# Patient Record
Sex: FEMALE | Race: WHITE | Employment: OTHER | URBAN - METROPOLITAN AREA
[De-identification: names, ages, dates, MRNs, and addresses within clinical notes are randomized per-mention and may not be internally consistent; named-entity substitution may affect disease eponyms.]

---

## 2016-07-07 LAB
CHOLESTEROL, TOTAL: 168 MG/DL
CHOLESTEROL/HDL RATIO: NORMAL
HDLC SERPL-MCNC: 44 MG/DL (ref 35–70)
LDL CHOLESTEROL CALCULATED: 69 MG/DL (ref 0–160)
TRIGL SERPL-MCNC: 275 MG/DL
VLDLC SERPL CALC-MCNC: NORMAL MG/DL

## 2016-10-18 LAB
BASOPHILS ABSOLUTE: NORMAL /ΜL
BASOPHILS RELATIVE PERCENT: NORMAL %
EOSINOPHILS ABSOLUTE: NORMAL /ΜL
EOSINOPHILS RELATIVE PERCENT: NORMAL %
HBA1C MFR BLD: 6.2 %
HCT VFR BLD CALC: 39.7 % (ref 36–46)
HEMOGLOBIN: 13.6 G/DL (ref 12–16)
LYMPHOCYTES ABSOLUTE: NORMAL /ΜL
LYMPHOCYTES RELATIVE PERCENT: NORMAL %
MCH RBC QN AUTO: NORMAL PG
MCHC RBC AUTO-ENTMCNC: NORMAL G/DL
MCV RBC AUTO: NORMAL FL
MONOCYTES ABSOLUTE: NORMAL /ΜL
MONOCYTES RELATIVE PERCENT: NORMAL %
NEUTROPHILS ABSOLUTE: NORMAL /ΜL
NEUTROPHILS RELATIVE PERCENT: NORMAL %
PLATELET # BLD: 237 K/ΜL
PMV BLD AUTO: NORMAL FL
RBC # BLD: 4.22 10^6/ΜL
WBC # BLD: 8 10^3/ML

## 2016-10-19 LAB
ALBUMIN SERPL-MCNC: 4.5 G/DL
ALP BLD-CCNC: 35 U/L
ALT SERPL-CCNC: 11 U/L
AST SERPL-CCNC: 20 U/L
BILIRUB SERPL-MCNC: 0.4 MG/DL (ref 0.1–1.4)
BUN BLDV-MCNC: 52 MG/DL
CALCIUM SERPL-MCNC: 9.8 MG/DL
CHLORIDE BLD-SCNC: 95 MMOL/L
CO2: 29 MMOL/L
CREAT SERPL-MCNC: 1.2 MG/DL
GFR CALCULATED: 42
GLUCOSE BLD-MCNC: 134 MG/DL
POTASSIUM SERPL-SCNC: 4.2 MMOL/L
SODIUM BLD-SCNC: 138 MMOL/L
TOTAL PROTEIN: 608

## 2017-04-12 RX ORDER — DIGOXIN 125 MCG
125 TABLET ORAL DAILY
Qty: 90 TABLET | Refills: 3 | Status: CANCELLED | OUTPATIENT
Start: 2017-04-12 | End: 2018-04-13

## 2017-04-13 RX ORDER — DIGOXIN 125 MCG
125 TABLET ORAL DAILY
Qty: 90 TABLET | Refills: 3 | Status: SHIPPED | OUTPATIENT
Start: 2017-04-13 | End: 2018-03-02 | Stop reason: SDUPTHER

## 2017-06-05 ENCOUNTER — OFFICE VISIT (OUTPATIENT)
Dept: CARDIOLOGY CLINIC | Age: 82
End: 2017-06-05

## 2017-06-05 VITALS
DIASTOLIC BLOOD PRESSURE: 60 MMHG | SYSTOLIC BLOOD PRESSURE: 136 MMHG | BODY MASS INDEX: 24.9 KG/M2 | HEART RATE: 84 BPM | WEIGHT: 126.8 LBS | HEIGHT: 60 IN

## 2017-06-05 DIAGNOSIS — Z98.61 HISTORY OF PTCA: ICD-10-CM

## 2017-06-05 DIAGNOSIS — Z95.1 S/P CABG X 1: ICD-10-CM

## 2017-06-05 DIAGNOSIS — Z86.79 H/O CARDIOMYOPATHY: ICD-10-CM

## 2017-06-05 DIAGNOSIS — Z98.890 H/O MITRAL VALVE REPAIR: ICD-10-CM

## 2017-06-05 DIAGNOSIS — I10 ESSENTIAL HYPERTENSION: ICD-10-CM

## 2017-06-05 DIAGNOSIS — I34.0 NON-RHEUMATIC MITRAL REGURGITATION: ICD-10-CM

## 2017-06-05 DIAGNOSIS — I36.1 NON-RHEUMATIC TRICUSPID VALVE INSUFFICIENCY: ICD-10-CM

## 2017-06-05 DIAGNOSIS — I38 VHD (VALVULAR HEART DISEASE): ICD-10-CM

## 2017-06-05 DIAGNOSIS — I27.20 PULMONARY HTN (HCC): ICD-10-CM

## 2017-06-05 DIAGNOSIS — I25.10 CORONARY ARTERY DISEASE INVOLVING NATIVE CORONARY ARTERY OF NATIVE HEART WITHOUT ANGINA PECTORIS: Primary | ICD-10-CM

## 2017-06-05 DIAGNOSIS — E78.2 MIXED HYPERLIPIDEMIA: ICD-10-CM

## 2017-06-05 PROCEDURE — 99213 OFFICE O/P EST LOW 20 MIN: CPT | Performed by: INTERNAL MEDICINE

## 2017-06-13 ENCOUNTER — TELEPHONE (OUTPATIENT)
Dept: CARDIOLOGY CLINIC | Age: 82
End: 2017-06-13

## 2017-10-16 LAB
ALBUMIN SERPL-MCNC: 4.6 G/DL
ALP BLD-CCNC: 34 U/L
ALT SERPL-CCNC: 12 U/L
ANION GAP SERPL CALCULATED.3IONS-SCNC: NORMAL MMOL/L
AST SERPL-CCNC: 15 U/L
AVERAGE GLUCOSE: NORMAL
BASOPHILS ABSOLUTE: 0 /ΜL
BASOPHILS RELATIVE PERCENT: 0.6 %
BILIRUB SERPL-MCNC: 0.5 MG/DL (ref 0.1–1.4)
BUN BLDV-MCNC: 64 MG/DL
CALCIUM SERPL-MCNC: 10.5 MG/DL
CHLORIDE BLD-SCNC: 97 MMOL/L
CHOLESTEROL, TOTAL: 203 MG/DL
CHOLESTEROL/HDL RATIO: NORMAL
CO2: 28 MMOL/L
CREAT SERPL-MCNC: 1.2 MG/DL
EOSINOPHILS ABSOLUTE: 0.2 /ΜL
EOSINOPHILS RELATIVE PERCENT: 2.7 %
GFR CALCULATED: NORMAL
GLUCOSE BLD-MCNC: 124 MG/DL
HBA1C MFR BLD: 5.5 %
HCT VFR BLD CALC: 42.1 % (ref 36–46)
HDLC SERPL-MCNC: 41 MG/DL (ref 35–70)
HEMOGLOBIN: 14.2 G/DL (ref 12–16)
LDL CHOLESTEROL CALCULATED: NORMAL MG/DL (ref 0–160)
LYMPHOCYTES ABSOLUTE: 1.3 /ΜL
LYMPHOCYTES RELATIVE PERCENT: 20.8 %
MCH RBC QN AUTO: 32.6 PG
MCHC RBC AUTO-ENTMCNC: NORMAL G/DL
MCV RBC AUTO: 96.8 FL
MONOCYTES ABSOLUTE: 0.5 /ΜL
MONOCYTES RELATIVE PERCENT: 7.3 %
NEUTROPHILS ABSOLUTE: 4.4 /ΜL
NEUTROPHILS RELATIVE PERCENT: 68.6 %
PLATELET # BLD: 218 K/ΜL
PMV BLD AUTO: NORMAL FL
POTASSIUM SERPL-SCNC: 4.5 MMOL/L
RBC # BLD: 4.35 10^6/ΜL
SODIUM BLD-SCNC: 142 MMOL/L
TOTAL PROTEIN: 7.2
TRIGL SERPL-MCNC: 409 MG/DL
VLDLC SERPL CALC-MCNC: NORMAL MG/DL
WBC # BLD: 6.4 10^3/ML

## 2017-12-22 RX ORDER — FENOFIBRATE 145 MG/1
TABLET, COATED ORAL
Qty: 90 TABLET | Refills: 3 | Status: SHIPPED | OUTPATIENT
Start: 2017-12-22 | End: 2018-11-12

## 2018-03-04 RX ORDER — DIGOXIN 125 MCG
TABLET ORAL
Qty: 90 TABLET | Refills: 3 | Status: SHIPPED | OUTPATIENT
Start: 2018-03-04 | End: 2019-03-05 | Stop reason: SDUPTHER

## 2018-06-11 ENCOUNTER — OFFICE VISIT (OUTPATIENT)
Dept: CARDIOLOGY CLINIC | Age: 83
End: 2018-06-11

## 2018-06-11 VITALS
BODY MASS INDEX: 24.46 KG/M2 | WEIGHT: 124.6 LBS | SYSTOLIC BLOOD PRESSURE: 122 MMHG | DIASTOLIC BLOOD PRESSURE: 60 MMHG | HEIGHT: 60 IN | HEART RATE: 68 BPM

## 2018-06-11 DIAGNOSIS — R60.0 LEG EDEMA: ICD-10-CM

## 2018-06-11 DIAGNOSIS — I25.10 CORONARY ARTERY DISEASE INVOLVING NATIVE CORONARY ARTERY OF NATIVE HEART WITHOUT ANGINA PECTORIS: Primary | ICD-10-CM

## 2018-06-11 DIAGNOSIS — Z98.890 H/O MITRAL VALVE REPAIR: ICD-10-CM

## 2018-06-11 DIAGNOSIS — I10 ESSENTIAL HYPERTENSION: ICD-10-CM

## 2018-06-11 DIAGNOSIS — I38 VHD (VALVULAR HEART DISEASE): ICD-10-CM

## 2018-06-11 DIAGNOSIS — I27.20 PULMONARY HTN (HCC): ICD-10-CM

## 2018-06-11 DIAGNOSIS — Z87.19 H/O HIATAL HERNIA: ICD-10-CM

## 2018-06-11 DIAGNOSIS — Z98.61 HISTORY OF PTCA: ICD-10-CM

## 2018-06-11 DIAGNOSIS — E78.2 MIXED HYPERLIPIDEMIA: ICD-10-CM

## 2018-06-11 DIAGNOSIS — I36.1 NON-RHEUMATIC TRICUSPID VALVE INSUFFICIENCY: ICD-10-CM

## 2018-06-11 DIAGNOSIS — Z86.79 H/O CARDIOMYOPATHY: ICD-10-CM

## 2018-06-11 DIAGNOSIS — Z95.1 S/P CABG X 1: ICD-10-CM

## 2018-06-11 DIAGNOSIS — I34.0 NON-RHEUMATIC MITRAL REGURGITATION: ICD-10-CM

## 2018-06-11 PROCEDURE — 1036F TOBACCO NON-USER: CPT | Performed by: INTERNAL MEDICINE

## 2018-06-11 PROCEDURE — 1123F ACP DISCUSS/DSCN MKR DOCD: CPT | Performed by: INTERNAL MEDICINE

## 2018-06-11 PROCEDURE — G8400 PT W/DXA NO RESULTS DOC: HCPCS | Performed by: INTERNAL MEDICINE

## 2018-06-11 PROCEDURE — G8427 DOCREV CUR MEDS BY ELIG CLIN: HCPCS | Performed by: INTERNAL MEDICINE

## 2018-06-11 PROCEDURE — 1090F PRES/ABSN URINE INCON ASSESS: CPT | Performed by: INTERNAL MEDICINE

## 2018-06-11 PROCEDURE — 4040F PNEUMOC VAC/ADMIN/RCVD: CPT | Performed by: INTERNAL MEDICINE

## 2018-06-11 PROCEDURE — 99214 OFFICE O/P EST MOD 30 MIN: CPT | Performed by: INTERNAL MEDICINE

## 2018-06-11 PROCEDURE — G8598 ASA/ANTIPLAT THER USED: HCPCS | Performed by: INTERNAL MEDICINE

## 2018-06-11 PROCEDURE — G8420 CALC BMI NORM PARAMETERS: HCPCS | Performed by: INTERNAL MEDICINE

## 2018-06-11 RX ORDER — NEBIVOLOL 20 MG/1
20 TABLET ORAL 2 TIMES DAILY
Qty: 180 TABLET | Refills: 3 | Status: SHIPPED | OUTPATIENT
Start: 2018-06-11 | End: 2020-04-15 | Stop reason: SDUPTHER

## 2018-06-11 RX ORDER — ICOSAPENT ETHYL 1000 MG/1
2 CAPSULE ORAL 2 TIMES DAILY
Qty: 60 CAPSULE | Refills: 5 | Status: SHIPPED | OUTPATIENT
Start: 2018-06-11 | End: 2018-06-18 | Stop reason: SDUPTHER

## 2018-06-14 ENCOUNTER — HOSPITAL ENCOUNTER (OUTPATIENT)
Dept: GENERAL RADIOLOGY | Age: 83
Discharge: HOME | End: 2018-06-14
Attending: INTERNAL MEDICINE | Admitting: INTERNAL MEDICINE

## 2018-06-14 LAB
ALBUMIN SERPL-MCNC: 4.5 GM/DL (ref 3.4–5)
ALP BLD-CCNC: 35 IU/L (ref 40–129)
ALT SERPL-CCNC: 13 U/L (ref 10–40)
AST SERPL-CCNC: 20 IU/L (ref 15–37)
BILIRUB SERPL-MCNC: 0.4 MG/DL (ref 0–1)
BILIRUBIN DIRECT: 0.2 MG/DL (ref 0–0.3)
BILIRUBIN, INDIRECT: 0.2 MG/DL (ref 0–0.7)
CHOLESTEROL: 160 MG/DL
HDLC SERPL-MCNC: 42 MG/DL
LDL CHOLESTEROL DIRECT: 90 MG/DL
TOTAL PROTEIN: 7 GM/DL (ref 6.4–8.2)
TRIGL SERPL-MCNC: 281 MG/DL

## 2018-06-18 RX ORDER — ICOSAPENT ETHYL 1000 MG/1
2 CAPSULE ORAL 2 TIMES DAILY
Qty: 120 CAPSULE | Refills: 11 | Status: SHIPPED | OUTPATIENT
Start: 2018-06-18 | End: 2019-07-04 | Stop reason: SDUPTHER

## 2018-06-25 ENCOUNTER — PROCEDURE VISIT (OUTPATIENT)
Dept: CARDIOLOGY CLINIC | Age: 83
End: 2018-06-25

## 2018-06-25 DIAGNOSIS — Z98.61 HISTORY OF PTCA: ICD-10-CM

## 2018-06-25 DIAGNOSIS — E78.2 MIXED HYPERLIPIDEMIA: ICD-10-CM

## 2018-06-25 DIAGNOSIS — Z86.79 H/O CARDIOMYOPATHY: ICD-10-CM

## 2018-06-25 DIAGNOSIS — Z98.890 H/O MITRAL VALVE REPAIR: ICD-10-CM

## 2018-06-25 DIAGNOSIS — I34.0 NON-RHEUMATIC MITRAL REGURGITATION: ICD-10-CM

## 2018-06-25 DIAGNOSIS — I27.20 PULMONARY HTN (HCC): ICD-10-CM

## 2018-06-25 DIAGNOSIS — I25.10 CORONARY ARTERY DISEASE INVOLVING NATIVE CORONARY ARTERY OF NATIVE HEART WITHOUT ANGINA PECTORIS: ICD-10-CM

## 2018-06-25 DIAGNOSIS — Z87.19 H/O HIATAL HERNIA: ICD-10-CM

## 2018-06-25 DIAGNOSIS — Z95.1 S/P CABG X 1: ICD-10-CM

## 2018-06-25 DIAGNOSIS — R60.0 LEG EDEMA: ICD-10-CM

## 2018-06-25 DIAGNOSIS — I36.1 NON-RHEUMATIC TRICUSPID VALVE INSUFFICIENCY: ICD-10-CM

## 2018-06-25 DIAGNOSIS — I10 ESSENTIAL HYPERTENSION: ICD-10-CM

## 2018-06-25 DIAGNOSIS — I38 VHD (VALVULAR HEART DISEASE): Primary | ICD-10-CM

## 2018-06-25 LAB
LV EF: 53 %
LVEF MODALITY: NORMAL

## 2018-06-25 PROCEDURE — 93306 TTE W/DOPPLER COMPLETE: CPT | Performed by: INTERNAL MEDICINE

## 2018-06-27 ENCOUNTER — TELEPHONE (OUTPATIENT)
Dept: CARDIOLOGY CLINIC | Age: 83
End: 2018-06-27

## 2018-06-29 RX ORDER — OMEGA-3-ACID ETHYL ESTERS 1 G/1
2 CAPSULE, LIQUID FILLED ORAL 2 TIMES DAILY
Qty: 180 CAPSULE | Refills: 3 | Status: SHIPPED | OUTPATIENT
Start: 2018-06-29 | End: 2018-11-12

## 2018-11-12 ENCOUNTER — OFFICE VISIT (OUTPATIENT)
Dept: CARDIOLOGY CLINIC | Age: 83
End: 2018-11-12
Payer: MEDICARE

## 2018-11-12 VITALS
BODY MASS INDEX: 24.03 KG/M2 | HEIGHT: 60 IN | SYSTOLIC BLOOD PRESSURE: 146 MMHG | HEART RATE: 80 BPM | WEIGHT: 122.4 LBS | DIASTOLIC BLOOD PRESSURE: 70 MMHG

## 2018-11-12 DIAGNOSIS — Z95.1 S/P CABG X 1: ICD-10-CM

## 2018-11-12 DIAGNOSIS — Z86.79 H/O CARDIOMYOPATHY: ICD-10-CM

## 2018-11-12 DIAGNOSIS — Z98.890 H/O MITRAL VALVE REPAIR: ICD-10-CM

## 2018-11-12 DIAGNOSIS — I34.0 NON-RHEUMATIC MITRAL REGURGITATION: ICD-10-CM

## 2018-11-12 DIAGNOSIS — I27.20 PULMONARY HTN (HCC): ICD-10-CM

## 2018-11-12 DIAGNOSIS — I10 ESSENTIAL HYPERTENSION: ICD-10-CM

## 2018-11-12 DIAGNOSIS — I38 VHD (VALVULAR HEART DISEASE): ICD-10-CM

## 2018-11-12 DIAGNOSIS — I36.1 NON-RHEUMATIC TRICUSPID VALVE INSUFFICIENCY: ICD-10-CM

## 2018-11-12 DIAGNOSIS — E78.2 MIXED HYPERLIPIDEMIA: ICD-10-CM

## 2018-11-12 DIAGNOSIS — I25.10 CORONARY ARTERY DISEASE INVOLVING NATIVE CORONARY ARTERY OF NATIVE HEART WITHOUT ANGINA PECTORIS: Primary | ICD-10-CM

## 2018-11-12 DIAGNOSIS — Z98.61 HISTORY OF PTCA: ICD-10-CM

## 2018-11-12 PROCEDURE — 99214 OFFICE O/P EST MOD 30 MIN: CPT | Performed by: INTERNAL MEDICINE

## 2018-11-12 PROCEDURE — 1101F PT FALLS ASSESS-DOCD LE1/YR: CPT | Performed by: INTERNAL MEDICINE

## 2018-11-12 PROCEDURE — G8598 ASA/ANTIPLAT THER USED: HCPCS | Performed by: INTERNAL MEDICINE

## 2018-11-12 PROCEDURE — 1123F ACP DISCUSS/DSCN MKR DOCD: CPT | Performed by: INTERNAL MEDICINE

## 2018-11-12 PROCEDURE — G8420 CALC BMI NORM PARAMETERS: HCPCS | Performed by: INTERNAL MEDICINE

## 2018-11-12 PROCEDURE — 1090F PRES/ABSN URINE INCON ASSESS: CPT | Performed by: INTERNAL MEDICINE

## 2018-11-12 PROCEDURE — 1036F TOBACCO NON-USER: CPT | Performed by: INTERNAL MEDICINE

## 2018-11-12 PROCEDURE — G8484 FLU IMMUNIZE NO ADMIN: HCPCS | Performed by: INTERNAL MEDICINE

## 2018-11-12 PROCEDURE — 93000 ELECTROCARDIOGRAM COMPLETE: CPT | Performed by: INTERNAL MEDICINE

## 2018-11-12 PROCEDURE — G8427 DOCREV CUR MEDS BY ELIG CLIN: HCPCS | Performed by: INTERNAL MEDICINE

## 2018-11-12 PROCEDURE — G8400 PT W/DXA NO RESULTS DOC: HCPCS | Performed by: INTERNAL MEDICINE

## 2018-11-12 PROCEDURE — 4040F PNEUMOC VAC/ADMIN/RCVD: CPT | Performed by: INTERNAL MEDICINE

## 2018-11-12 NOTE — PROGRESS NOTES
7/2010    H/O transesophageal echocardiography (BRITTANI) for monitoring 06-    3-4+ MITRIL REGURG. AND MILD TRICUSPID REGURG.  History of IBS     History of PTCA 11-    STENT (3.0 X 12 PROMUS) RCA. MILD OSTIAL DISEASE LM. CRITICAL OSTIAL DISEASE RCA. ELEVATED SYSTEMIC PRESSURES AT REST. MILD MITRIL [address]  Hx of cardiovascular stress test 8/17/2015    cardiolite-normal    Hx of Doppler echocardiogram 5/6/2013    EF55%  mild mitral regurg, moderate tricuspid regurg, right ventricular systolic pressure elevated at 60mmHg    Hx of Doppler echocardiogram 06/25/2018    LV function and size are normal, Ejection Fraction 50-55 %. Normal left ventricular wall thickness. No regional wall motion abnormalities were detected. Normal diastolic filling pattern for age. 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 CABG X 1 W/SVG TO RCA.      FOOT SURGERY  10-    LEFT    HYSTERECTOMY  1990    JOINT REPLACEMENT  1989 AND 2001    BILATERAL HIPS      As reviewed   Family History   Problem Relation Age of Onset    High Blood Pressure Mother     Heart Disease Father     High Blood Pressure Father     Cancer Brother         COLON     Social History   Substance Use Topics    Smoking status: Never Smoker    Smokeless tobacco: Never Used      Comment: Reviewed 11/1/16    Alcohol use No      Review of Systems:    Constitutional: Negative for diaphoresis and fatigue  Psychological:Negative for anxiety or depression  HENT: Negative for headaches, nasal congestion, sinus pain or vertigo  Eyes: Date    WBC 6.4 10/16/2017    HCT 42.1 10/16/2017    MCV 96.8 10/16/2017     10/16/2017     Lab Results   Component Value Date    CHOL 160 06/14/2018    TRIG 281 (H) 06/14/2018    HDL 42 06/14/2018    LDLCALC 69 07/06/2016    LDLDIRECT 90 06/14/2018     Lab Results   Component Value Date    ALT 13 06/14/2018    AST 20 06/14/2018     BMP:    Lab Results   Component Value Date     10/16/2017    K 4.5 10/16/2017    CL 97 10/16/2017    CO2 28 10/16/2017    BUN 64 10/16/2017    CREATININE 1.2 10/16/2017     CMP:   Lab Results   Component Value Date     10/16/2017    K 4.5 10/16/2017    CL 97 10/16/2017    CO2 28 10/16/2017    BUN 64 10/16/2017    PROT 7.0 06/14/2018     TSH:    Lab Results   Component Value Date    TSH 2.8 09/02/2014       QUALITY MEASURES REVIEWED:  1.CAD:Patient is taking anti platelet agent:Yes  2. DYSLIPIDEMIA: Patient is on cholesterol lowering medication:Yes  3. Beta-Blocker therapy for CAD, if prior Myocardial Infarction:Yes  4. Atrial fibrillation & anticoagulation therapy No    Impression:    1. Coronary artery disease involving native coronary artery of native heart without angina pectoris    2. H/O cardiomyopathy    3. H/O mitral valve repair    4. History of PTCA    5. Mixed hyperlipidemia    6. Essential hypertension    7. Non-rheumatic mitral regurgitation    8. Pulmonary HTN ([facility] [address]    9. S/P CABG x 1    10. Non-rheumatic tricuspid valve insufficiency    11.  VHD (valvular heart disease)       Patient Active Problem List   Diagnosis Code    Hypertension I10    CAD (coronary artery disease) I25.10    S/P CABG x 1 Z95.1    VHD (valvular heart disease) I38    Mitral regurgitation I34.0    Tricuspid regurgitation I07.1    H/O mitral valve repair Z98.890    Cancer ([facility] [address] C80.1    History of IBS Z87.19    H/O hiatal hernia Z87.19    Hyperlipidemia E78.5    History of PTCA Z80.64    H/O cardiomyopathy Z86.79    DJD (degenerative joint disease) M19.90    Pulmonary HTN

## 2018-12-17 RX ORDER — OMEGA-3-ACID ETHYL ESTERS 1 G/1
CAPSULE, LIQUID FILLED ORAL
Qty: 180 CAPSULE | Refills: 3 | Status: SHIPPED | OUTPATIENT
Start: 2018-12-17 | End: 2019-06-17

## 2019-03-06 RX ORDER — DIGOXIN 125 MCG
125 TABLET ORAL DAILY
Qty: 90 TABLET | Refills: 3 | Status: SHIPPED | OUTPATIENT
Start: 2019-03-06 | End: 2020-04-15 | Stop reason: SDUPTHER

## 2019-05-17 ENCOUNTER — TELEPHONE (OUTPATIENT)
Dept: CARDIOLOGY CLINIC | Age: 84
End: 2019-05-17

## 2019-05-17 NOTE — TELEPHONE ENCOUNTER
Called patient to reschedule, she had serious fall and unable to come in at this time.   I have refil request from [facility] for bystolic, states she has plenty and do not reorder now

## 2019-05-29 RX ORDER — NEBIVOLOL 20 MG/1
20 TABLET ORAL 2 TIMES DAILY
Qty: 180 TABLET | Refills: 3 | OUTPATIENT
Start: 2019-05-29

## 2019-06-17 RX ORDER — OMEGA-3-ACID ETHYL ESTERS 1 G/1
1 CAPSULE, LIQUID FILLED ORAL 2 TIMES DAILY
Qty: 180 CAPSULE | Refills: 3 | Status: SHIPPED | OUTPATIENT
Start: 2019-06-17 | End: 2020-06-01

## 2019-07-08 RX ORDER — ICOSAPENT ETHYL 1000 MG/1
2 CAPSULE ORAL 2 TIMES DAILY
Qty: 120 CAPSULE | Refills: 11 | Status: SHIPPED | OUTPATIENT
Start: 2019-07-08 | End: 2020-06-25

## 2020-04-17 RX ORDER — DIGOXIN 125 MCG
125 TABLET ORAL DAILY
Qty: 90 TABLET | Refills: 3 | Status: SHIPPED | OUTPATIENT
Start: 2020-04-17 | End: 2020-07-14 | Stop reason: SDUPTHER

## 2020-04-17 RX ORDER — NEBIVOLOL 20 MG/1
20 TABLET ORAL 2 TIMES DAILY
Qty: 180 TABLET | Refills: 3 | Status: SHIPPED | OUTPATIENT
Start: 2020-04-17 | End: 2020-07-14 | Stop reason: SDUPTHER

## 2020-06-02 RX ORDER — OMEGA-3-ACID ETHYL ESTERS 1 G/1
1 CAPSULE, LIQUID FILLED ORAL 2 TIMES DAILY
Qty: 180 CAPSULE | Refills: 3 | Status: SHIPPED | OUTPATIENT
Start: 2020-06-02 | End: 2021-04-29

## 2020-06-25 RX ORDER — ICOSAPENT ETHYL 1000 MG/1
2 CAPSULE ORAL 2 TIMES DAILY
Qty: 120 CAPSULE | Refills: 3 | Status: SHIPPED | OUTPATIENT
Start: 2020-06-25 | End: 2020-11-09

## 2020-07-14 ENCOUNTER — VIRTUAL VISIT (OUTPATIENT)
Dept: CARDIOLOGY CLINIC | Age: 85
End: 2020-07-14
Payer: MEDICARE

## 2020-07-14 PROCEDURE — 4040F PNEUMOC VAC/ADMIN/RCVD: CPT | Performed by: INTERNAL MEDICINE

## 2020-07-14 PROCEDURE — 1123F ACP DISCUSS/DSCN MKR DOCD: CPT | Performed by: INTERNAL MEDICINE

## 2020-07-14 PROCEDURE — G8427 DOCREV CUR MEDS BY ELIG CLIN: HCPCS | Performed by: INTERNAL MEDICINE

## 2020-07-14 PROCEDURE — 1090F PRES/ABSN URINE INCON ASSESS: CPT | Performed by: INTERNAL MEDICINE

## 2020-07-14 PROCEDURE — 99213 OFFICE O/P EST LOW 20 MIN: CPT | Performed by: INTERNAL MEDICINE

## 2020-07-14 PROCEDURE — G8400 PT W/DXA NO RESULTS DOC: HCPCS | Performed by: INTERNAL MEDICINE

## 2020-07-14 PROCEDURE — G8421 BMI NOT CALCULATED: HCPCS | Performed by: INTERNAL MEDICINE

## 2020-07-14 PROCEDURE — 1036F TOBACCO NON-USER: CPT | Performed by: INTERNAL MEDICINE

## 2020-07-14 RX ORDER — NEBIVOLOL 20 MG/1
20 TABLET ORAL 2 TIMES DAILY
Qty: 180 TABLET | Refills: 3 | Status: SHIPPED | OUTPATIENT
Start: 2020-07-14 | End: 2021-07-06 | Stop reason: SDUPTHER

## 2020-07-14 RX ORDER — DIGOXIN 125 MCG
125 TABLET ORAL DAILY
Qty: 90 TABLET | Refills: 3 | Status: SHIPPED | OUTPATIENT
Start: 2020-07-14 | End: 2021-07-06 | Stop reason: SDUPTHER

## 2020-07-14 NOTE — LETTER
Lisa Reagan Sheets  1935  [id number]    Have you had any Chest Pain - No    Have you had any Shortness of Breath - No     Have you had any dizziness - No    Have you had any palpitations - No    Do you have any edema - swelling in legs    If Yes - CHECK TO SEE IF THE EDEMA IS PITTING  How long have they been having edema - in the last week  If Yes - Have they worn compression stockings yes  Notices it more when it is hot outside.     Check Venous \"LEG PROBLEM Questionnaire\"    Ask patient [facility] if [facility] are not already [facility]    Check to see if we have an E-MAIL on file for the patient    Check medication list thoroughly!!!  BE SURE TO ASK PATIENT IF THEY NEED MEDICATION REFILLS

## 2020-07-14 NOTE — LETTER
[address]  [address] [address]  Phone: [phone]  Fax: [phone]    Rachna Bojorquez MD        July 14, 2020     Deshaun Amaro MD  [place] [address]    Patient: Markus Dia  MR Number: [id number]  YOB: 1935  Date of Visit: 7/14/2020    Dear Dr. Deshaun Amaro:    Thank you for the request for consultation for Imani Lozoya to me for the evaluation of CAD / VHD. Below are the relevant portions of my assessment and plan of care. If you have questions, please do not hesitate to call me. I look forward to following Raquel Isaac along with you.     Sincerely,        Rachna Bojorquez MD

## 2020-07-14 NOTE — PROGRESS NOTES
OFFICE PROGRESS NOTES      Lieutenant Collins is a 80 y.o. female who has    CHIEF COMPLAINT AS FOLLOWS:  CHEST PAIN: Patient denies any C/O chest pains at this time. SOB: No C/O SOB at this time. LEG EDEMA: + leg edema   PALPITATIONS: Denies any C/O Palpitations                                 DIZZINESS: No C/O Dizziness                      SYNCOPE: None   OTHER:                                     HPI: Patient is here for F/U on her CAD, HTN & Dyslipidemia problems. She does not have any new complaints at this time.     Kristina Parker has the following history recorded in care path:  Patient Active Problem List    Diagnosis Date Noted    SOB (shortness of breath)     Leg edema     Hypertension     CAD (coronary artery disease)     S/P CABG x 1     VHD (valvular heart disease)     Mitral regurgitation     Tricuspid regurgitation     H/O mitral valve repair     Cancer ([facility] [address]     History of IBS     H/O hiatal hernia     Hyperlipidemia     H/O cardiomyopathy     DJD (degenerative joint disease)     Pulmonary HTN ([facility] [address]     Hx of glaucoma     History of PTCA 11/23/2009     Current Outpatient Medications   Medication Sig Dispense Refill    sertraline (ZOLOFT) 50 MG tablet TAKE 1 TABLET BY MOUTH EVERY DAY      nebivolol (BYSTOLIC) 20 MG TABS tablet Take 1 tablet by mouth 2 times daily 180 tablet 3    digoxin (LANOXIN) 125 MCG tablet Take 1 tablet by mouth daily 90 tablet 3    Icosapent Ethyl (VASCEPA) 1 g CAPS capsule Take 2 capsules by mouth 2 times daily 120 capsule 3    omega-3 acid ethyl esters (LOVAZA) 1 g capsule Take 1 capsule by mouth 2 times daily 180 capsule 3    cyclobenzaprine (FLEXERIL) 10 MG tablet Take 10 mg by mouth 2 times daily      gabapentin (NEURONTIN) 100 MG capsule Take 100 mg by mouth 3 times daily      amLODIPine (NORVASC) 2.5 MG tablet Take 1 tablet by mouth daily 30 tablet 3    History of IBS     History of PTCA 11-    STENT (3.0 X 12 PROMUS) RCA. MILD OSTIAL DISEASE LM. CRITICAL OSTIAL DISEASE RCA. ELEVATED SYSTEMIC PRESSURES AT REST. MILD MITRIL [address]  Hx of cardiovascular stress test 8/17/2015    cardiolite-normal    Hx of Doppler echocardiogram 5/6/2013    EF55%  mild mitral regurg, moderate tricuspid regurg, right ventricular systolic pressure elevated at 60mmHg    Hx of Doppler echocardiogram 06/25/2018    LV function and size are normal, Ejection Fraction 50-55 %. Normal left ventricular wall thickness. No regional wall motion abnormalities were detected. Normal diastolic filling pattern for age. 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 CABG X 1 W/SVG TO RCA.  FOOT SURGERY  10-    LEFT    HYSTERECTOMY  1990    JOINT REPLACEMENT  1989 AND 2001    BILATERAL HIPS      As reviewed   Family History   Problem Relation Age of Onset    High Blood Pressure Mother     Heart Disease Father     High Blood Pressure Father     Cancer Brother         COLON     Social History     Tobacco Use    Smoking status: Never Smoker    Smokeless tobacco: Never Used    Tobacco comment: Reviewed 11/1/16   Substance Use Topics    Alcohol use: No     Alcohol/week: 0.0 standard drinks      Review of Systems:    Constitutional: Negative for diaphoresis and fatigue  Psychological:Negative for anxiety or depression  HENT: Negative for headaches, nasal congestion, sinus pain or vertigo  Eyes: Negative for visual disturbance.    Endocrine: Negative for polydipsia/polyuria  Respiratory: Negative for shortness of breath  Cardiovascular: Negative for chest pain, dyspnea on exertion, claudication, edema, irregular heartbeat, murmur, palpitations or shortness of breath  Gastrointestinal: Negative for abdominal pain or heartburn  Genito-Urinary: Negative for urinary frequency/urgency  Musculoskeletal: Negative for muscle pain, muscular weakness, negative for pain in arm and leg or swelling in foot and leg  Neurological: Negative for dizziness, headaches, memory loss, numbness/tingling, visual changes, syncope  Dermatological: Negative for rash    Objective: Wt Readings from Last 3 Encounters:   11/12/18 122 lb 6.4 oz (55.5 kg)   06/11/18 124 lb 9.6 oz (56.5 kg)   06/05/17 126 lb 12.8 oz (57.5 kg)     There is no height or weight on file to calculate BMI. Patient-Reported Vitals 7/14/2020   Patient-Reported Weight 129 lbs   Patient-Reported Height 5'   Patient-Reported Systolic 761   Patient-Reported Diastolic 53   Patient-Reported Pulse 71         GENERAL - Alert, oriented, pleasant, in no apparent distress.     Lab Review   No results found for: CKTOTAL, CKMB, CKMBINDEX, TROPONINT  BNP:    Lab Results   Component Value Date    BNP 72 08/10/2015    BNP 72 08/10/2015     PT/INR:  No results found for: INR  Lab Results   Component Value Date    LABA1C 5.5 10/16/2017    LABA1C 6.2 10/18/2016     Lab Results   Component Value Date    WBC 6.4 10/16/2017    WBC 8.0 10/18/2016    HCT 42.1 10/16/2017    HCT 39.7 10/18/2016    MCV 96.8 10/16/2017    MCV 95.8 09/02/2014     10/16/2017     10/18/2016     Lab Results   Component Value Date    CHOL 160 06/14/2018    CHOL 203 10/16/2017    TRIG 281 (H) 06/14/2018    TRIG 409 10/16/2017    HDL 42 06/14/2018    HDL 41 10/16/2017    LDLCALC 69 07/06/2016    LDLCALC 55 09/02/2014    LDLDIRECT 90 06/14/2018    LDLDIRECT 55 11/02/2010     Lab Results   Component Value Date    ALT 13 06/14/2018    ALT 12 10/16/2017    AST 20 06/14/2018    AST 15 10/16/2017     BMP:    Lab Results   Component Value Date     10/16/2017     10/18/2016    K 4.5 10/16/2017    K 4.2 10/18/2016    CL 97 10/16/2017    CL 95 10/18/2016    CO2 28 10/16/2017    CO2 29 10/18/2016    BUN 64 10/16/2017    BUN 52 10/18/2016    CREATININE 1.2 10/16/2017    CREATININE 1.2 10/18/2016     CMP:   Lab Results   Component Value Date     10/16/2017     10/18/2016    K 4.5 10/16/2017    K 4.2 10/18/2016    CL 97 10/16/2017    CL 95 10/18/2016    CO2 28 10/16/2017    CO2 29 10/18/2016    BUN 64 10/16/2017    BUN 52 10/18/2016    PROT 7.0 06/14/2018     TSH:    Lab Results   Component Value Date    TSH 2.8 09/02/2014       QUALITY MEASURES REVIEWED:  1.CAD:Patient is taking anti platelet agent:Yes  2. DYSLIPIDEMIA: Patient is on cholesterol lowering medication:Yes  3. Beta-Blocker therapy for CAD, if prior Myocardial Infarction:Yes  4. Atrial fibrillation & anticoagulation therapy No    Impression:    1. Coronary artery disease involving native coronary artery of native heart without angina pectoris    2. Essential hypertension    3. S/P CABG x 1    4. VHD (valvular heart disease)    5. Nonrheumatic mitral valve regurgitation    6. Nonrheumatic tricuspid valve regurgitation    7. H/O mitral valve repair    8. Mixed hyperlipidemia    9. History of PTCA    10. H/O cardiomyopathy    11. Pulmonary HTN ([facility] [address]    12. Hx of glaucoma    13. SOB (shortness of breath)    14.  Leg edema       Patient Active Problem List   Diagnosis Code    Hypertension I10    CAD (coronary artery disease) I25.10    S/P CABG x 1 Z95.1    VHD (valvular heart disease) I38    Mitral regurgitation I34.0    Tricuspid regurgitation I07.1    H/O mitral valve repair Z98.890    Cancer ([facility] [address] C80.1    History of IBS Z87.19    H/O hiatal hernia Z87.19    Hyperlipidemia E78.5    History of PTCA Z80.64    H/O cardiomyopathy Z86.79    DJD (degenerative joint disease) M19.90    none this visit                                      All previously ordered tests reviewed. ARRHYTHMIAS: H/OTachycardia   MEDICATIONS: CPM   Office f/u in six months. Primary/secondary prevention is the goal by aggressive risk modification, healthy and therapeutic life style changes for cardiovascular risk reduction. Various goals are discussed and multiple questions answered.

## 2020-07-14 NOTE — PATIENT INSTRUCTIONS
CAD:Yes   clinically stable. Patient is on optimal medical regimen ( see medication list above )  -     CORONARY ARTERY DISEASE: Patient is currently  asymptomatic from CAD. - changes in  treatment:   no           - Testing ordered:  no  [facility] classification: 1  [place] RISK SCORE:  [place] RISK SCORE:  HTN:well controlled on current medical regimen, see list above. - changes in  treatment:   no   CARDIOMYOPATHY:  known   CONGESTIVE HEART FAILURE: compensated     VHD: S/P Mitral valve repair  DYSLIPIDEMIA: Patient's profile is at / near Goal.yes,                                 HDL is low                                Tolerating current medical regimen wellyes,                                  See most recent Lab values in Labs section above. OTHER RELEVANT DIAGNOSIS:as noted in patient's active problem list:  TESTS ORDERED:   none this visit                                      All previously ordered tests reviewed. ARRHYTHMIAS: H/OTachycardia   MEDICATIONS: CPM   Office f/u in six months. Primary/secondary prevention is the goal by aggressive risk modification, healthy and therapeutic life style changes for cardiovascular risk reduction. Various goals are discussed and multiple questions answered.

## 2020-11-10 RX ORDER — ICOSAPENT ETHYL 1000 MG/1
2 CAPSULE ORAL 2 TIMES DAILY
Qty: 120 CAPSULE | Refills: 3 | Status: SHIPPED | OUTPATIENT
Start: 2020-11-10

## 2020-11-19 ENCOUNTER — TELEPHONE (OUTPATIENT)
Dept: CARDIOLOGY CLINIC | Age: 85
End: 2020-11-19

## 2021-03-29 ENCOUNTER — OFFICE VISIT (OUTPATIENT)
Dept: CARDIOLOGY CLINIC | Age: 86
End: 2021-03-29
Payer: MEDICARE

## 2021-03-29 VITALS
HEIGHT: 60 IN | HEART RATE: 68 BPM | SYSTOLIC BLOOD PRESSURE: 138 MMHG | BODY MASS INDEX: 23.95 KG/M2 | WEIGHT: 122 LBS | DIASTOLIC BLOOD PRESSURE: 70 MMHG

## 2021-03-29 DIAGNOSIS — I10 ESSENTIAL HYPERTENSION: ICD-10-CM

## 2021-03-29 DIAGNOSIS — I36.1 NONRHEUMATIC TRICUSPID VALVE REGURGITATION: ICD-10-CM

## 2021-03-29 DIAGNOSIS — I27.20 PULMONARY HTN (HCC): ICD-10-CM

## 2021-03-29 DIAGNOSIS — Z86.79 H/O CARDIOMYOPATHY: ICD-10-CM

## 2021-03-29 DIAGNOSIS — I25.10 CORONARY ARTERY DISEASE INVOLVING NATIVE CORONARY ARTERY OF NATIVE HEART WITHOUT ANGINA PECTORIS: Primary | ICD-10-CM

## 2021-03-29 DIAGNOSIS — I38 VHD (VALVULAR HEART DISEASE): ICD-10-CM

## 2021-03-29 DIAGNOSIS — E78.2 MIXED HYPERLIPIDEMIA: ICD-10-CM

## 2021-03-29 DIAGNOSIS — R60.0 LEG EDEMA: ICD-10-CM

## 2021-03-29 DIAGNOSIS — I34.0 NONRHEUMATIC MITRAL VALVE REGURGITATION: ICD-10-CM

## 2021-03-29 DIAGNOSIS — Z98.61 HISTORY OF PTCA: ICD-10-CM

## 2021-03-29 DIAGNOSIS — Z95.1 S/P CABG X 1: ICD-10-CM

## 2021-03-29 PROCEDURE — 93000 ELECTROCARDIOGRAM COMPLETE: CPT | Performed by: INTERNAL MEDICINE

## 2021-03-29 PROCEDURE — 1036F TOBACCO NON-USER: CPT | Performed by: INTERNAL MEDICINE

## 2021-03-29 PROCEDURE — 1090F PRES/ABSN URINE INCON ASSESS: CPT | Performed by: INTERNAL MEDICINE

## 2021-03-29 PROCEDURE — G8484 FLU IMMUNIZE NO ADMIN: HCPCS | Performed by: INTERNAL MEDICINE

## 2021-03-29 PROCEDURE — G8427 DOCREV CUR MEDS BY ELIG CLIN: HCPCS | Performed by: INTERNAL MEDICINE

## 2021-03-29 PROCEDURE — 99214 OFFICE O/P EST MOD 30 MIN: CPT | Performed by: INTERNAL MEDICINE

## 2021-03-29 PROCEDURE — G8400 PT W/DXA NO RESULTS DOC: HCPCS | Performed by: INTERNAL MEDICINE

## 2021-03-29 PROCEDURE — G8420 CALC BMI NORM PARAMETERS: HCPCS | Performed by: INTERNAL MEDICINE

## 2021-03-29 PROCEDURE — 1123F ACP DISCUSS/DSCN MKR DOCD: CPT | Performed by: INTERNAL MEDICINE

## 2021-03-29 PROCEDURE — 4040F PNEUMOC VAC/ADMIN/RCVD: CPT | Performed by: INTERNAL MEDICINE

## 2021-03-29 RX ORDER — NITROGLYCERIN 0.4 MG/1
0.4 TABLET SUBLINGUAL EVERY 5 MIN PRN
Qty: 25 TABLET | Refills: 3 | Status: SHIPPED | OUTPATIENT
Start: 2021-03-29

## 2021-03-29 NOTE — LETTER
Patient Name: Gonsalo Friend  : 1935  MRN# [id number]    REASON FOR VISIT:    Hypertension  CAD (coronary artery disease)  VHD (valvular heart disease)  Mitral regurgitation  Tricuspid regurgitation  Hyperlipidemia  Pulmonary HTN (HCC)    Current Outpatient Medications   Medication Sig Dispense Refill    Icosapent Ethyl (VASCEPA) 1 g CAPS capsule Take 2 capsules by mouth 2 times daily 120 capsule 3    sertraline (ZOLOFT) 50 MG tablet TAKE 1 TABLET BY MOUTH EVERY DAY      nebivolol (BYSTOLIC) 20 MG TABS tablet Take 1 tablet by mouth 2 times daily 180 tablet 3    digoxin (LANOXIN) 125 MCG tablet Take 1 tablet by mouth daily 90 tablet 3    omega-3 acid ethyl esters (LOVAZA) 1 g capsule Take 1 capsule by mouth 2 times daily 180 capsule 3    cyclobenzaprine (FLEXERIL) 10 MG tablet Take 10 mg by mouth 2 times daily      gabapentin (NEURONTIN) 100 MG capsule Take 100 mg by mouth 3 times daily      amLODIPine (NORVASC) 2.5 MG tablet Take 1 tablet by mouth daily 30 tablet 3    valsartan-hydrochlorothiazide (DIOVAN-HCT) 320-25 MG per tablet Take 1 tablet by mouth daily       HYDROcodone-acetaminophen (VICODIN) 5-500 MG per tablet Take 1 tablet by mouth every 6 hours as needed       Hypromellose (GENTEAL OP) Apply 1-2 drops to eye nightly.  furosemide (LASIX) 20 MG tablet Take 20 mg by mouth daily       estrogens, conjugated, (PREMARIN) 0.9 MG tablet Take 0.9 mg by mouth daily.  multivitamin (THERAGRAN) per tablet Take 1 tablet by mouth daily.  esomeprazole Magnesium (NEXIUM) 40 MG PACK Take 40 mg by mouth daily.  dicyclomine (BENTYL) 20 MG tablet Take 20 mg by mouth nightly.  aspirin 81 MG tablet Take 81 mg by mouth daily.  nitroGLYCERIN (NITROSTAT) 0.4 MG SL tablet Place 0.4 mg under the tongue every 5 minutes as needed.  azelastine (OPTIVAR) 0.05 % ophthalmic solution 1 drop 2 times daily. No current facility-administered medications for this visit. Last Vist:2020 video Kyler  Complaints:leg edema  Changes:none    Last EK2018    STRESS TEST:  2015    ECHO: 2018  LV function and size are normal, Ejection Fraction 50-55 %. Normal left ventricular wall thickness. No regional wall motion abnormalities were detected. Normal diastolic filling pattern for age. Mildly enlarged right ventricle cavity. Mitral valve repair noted. Moderate-to-severe tricuspid regurgitation. Right ventricular systolic pressure of 75 mm Hg consistent with moderate to   severe pulmonary hypertension. No evidence of pericardial effusion. CAROTID: NONE    MUGA: NONE    LAST PACER CHECK: NONE    CARDIAC CATH: NONE    Amio Protocol:None     CHADS: [id number] Score for Atrial Fibrillation Stroke Risk   Risk   Factors  Component Value   C CHF No 0   H HTN Yes 1   A2 Age >= 76 Yes,  (80 y.o.) 2   D DM No 0   S2 Prior Stroke/TIA No 0   V Vascular Disease No 0   A Age 74-69 No,  (80 y.o.) 0   Sc Sex female 1    [id number]  Score  4   Score last updated 3/29/21 7:15 AM EDT    Click here for a link to the UpToDate guideline \"Atrial Fibrillation: Anticoagulation therapy to prevent embolization    Disclaimer: Risk Score calculation is dependent on accuracy of patient problem list and past encounter diagnosis.

## 2021-03-29 NOTE — LETTER
[address]  [address] [address]  Phone: [phone]  Fax: [phone]    Sharita Cohen MD        March 29, 2021     [address]  [place] [address]    Patient: Zoya Mejia  MR Number: [id number]  YOB: 1935  Date of Visit: 3/29/2021    Dear Dr. Maria Victoria Melendrez:    Thank you for the request for consultation for Gordo Hark to me for the evaluation of CAD/VHD. Below are the relevant portions of my assessment and plan of care. If you have questions, please do not hesitate to call me. I look forward to following Aminata Marks along with you.     Sincerely,        Sharita Cohen MD

## 2021-03-29 NOTE — PROGRESS NOTES
 valsartan-hydrochlorothiazide (DIOVAN-HCT) 320-25 MG per tablet Take 1 tablet by mouth daily       HYDROcodone-acetaminophen (VICODIN) 5-500 MG per tablet Take 1 tablet by mouth every 6 hours as needed       Hypromellose (GENTEAL OP) Apply 1-2 drops to eye nightly.  furosemide (LASIX) 20 MG tablet Take 20 mg by mouth daily       estrogens, conjugated, (PREMARIN) 0.9 MG tablet Take 0.9 mg by mouth daily.  multivitamin (THERAGRAN) per tablet Take 1 tablet by mouth daily.  esomeprazole Magnesium (NEXIUM) 40 MG PACK Take 40 mg by mouth daily.  dicyclomine (BENTYL) 20 MG tablet Take 20 mg by mouth nightly.  aspirin 81 MG tablet Take 81 mg by mouth daily.  azelastine (OPTIVAR) 0.05 % ophthalmic solution 1 drop 2 times daily. No current facility-administered medications for this visit. Allergies: Niaspan [niacin er], Clopidogrel, and Hyzaar [losartan potassium-hctz]  Review of Systems:    Constitutional: Negative for diaphoresis and fatigue  Respiratory: Negative for shortness of breath  Cardiovascular: Negative for chest pain, dyspnea on exertion, claudication, edema, irregular heartbeat, murmur, palpitations or shortness of breath  Musculoskeletal: Negative for muscle pain, muscular weakness, negative for pain in arm and leg or swelling in foot and leg    Objective:  /70   Pulse 68   Ht 5' (1.524 m)   Wt 122 lb (55.3 kg)   BMI 23.83 kg/m²   Wt Readings from Last 3 Encounters:   03/29/21 122 lb (55.3 kg)   11/12/18 122 lb 6.4 oz (55.5 kg)   06/11/18 124 lb 9.6 oz (56.5 kg)     Body mass index is 23.83 kg/m². GENERAL - Alert, oriented, pleasant, in no apparent distress. EYES: No jaundice, no conjunctival pallor. Neck - Supple. No jugular venous distention noted. No carotid bruits. Cardiovascular - Normal S1 and S2 without obvious murmur or gallop. Extremities - No cyanosis, clubbing,There is significant edema noted with C4 venous changes. Pulmonary - No respiratory distress. No wheezes or rales. Lab Review   No results found for: TROPONINT  Lab Results   Component Value Date    BNP 72 08/10/2015    BNP 72 08/10/2015     No results found for: INR  Lab Results   Component Value Date    LABA1C 5.5 10/16/2017    LABA1C 6.2 10/18/2016     Lab Results   Component Value Date    WBC 6.4 10/16/2017    WBC 8.0 10/18/2016    HCT 42.1 10/16/2017    HCT 39.7 10/18/2016    MCV 96.8 10/16/2017    MCV 95.8 09/02/2014     10/16/2017     10/18/2016     Lab Results   Component Value Date    CHOL 160 06/14/2018    CHOL 203 10/16/2017    TRIG 281 (H) 06/14/2018    TRIG 409 10/16/2017    HDL 42 06/14/2018    HDL 41 10/16/2017    LDLCALC 69 07/06/2016    LDLCALC 55 09/02/2014    LDLDIRECT 90 06/14/2018    LDLDIRECT 55 11/02/2010     Lab Results   Component Value Date    ALT 13 06/14/2018    ALT 12 10/16/2017    AST 20 06/14/2018    AST 15 10/16/2017     BMP:    Lab Results   Component Value Date     10/16/2017     10/18/2016    K 4.5 10/16/2017    K 4.2 10/18/2016    CL 97 10/16/2017    CL 95 10/18/2016    CO2 28 10/16/2017    CO2 29 10/18/2016    BUN 64 10/16/2017    BUN 52 10/18/2016    CREATININE 1.2 10/16/2017    CREATININE 1.2 10/18/2016     CMP:   Lab Results   Component Value Date     10/16/2017     10/18/2016    K 4.5 10/16/2017    K 4.2 10/18/2016    CL 97 10/16/2017    CL 95 10/18/2016    CO2 28 10/16/2017    CO2 29 10/18/2016    BUN 64 10/16/2017    BUN 52 10/18/2016    CREATININE 1.2 10/16/2017    CREATININE 1.2 10/18/2016    PROT 7.0 06/14/2018     Lab Results   Component Value Date    TSH 2.8 09/02/2014     ECHO 6/2018   LV function and size are normal, Ejection Fraction 50-55 %. Normal left ventricular wall thickness. No regional wall motion abnormalities were detected. Normal diastolic filling pattern for age. Mildly enlarged right ventricle cavity. Mitral valve repair noted.    Moderate-to-severe

## 2021-03-29 NOTE — PATIENT INSTRUCTIONS
.Please hold on to these instructions the  will call you within 1-9 business days when we receive authorization from your insurance. Nuclear Stress Test    WHAT TO EXPECT:   ? You will need to confirm the test or it could be cancelled. ? This test will take approximately 2 hours: 1 hour in the AM &    1 hour in the PM. You will be given a time by the Technologist after the first part is completed to come back. ? You will be given a medication, through an IV in the hand, this will safely simulate exercise. This IV is also needed to inject the radioactive isotope unless you are able toe walk the treadmill. ? You will receive an injection in the AM & PM before the pictures. ? Using a special camera, you will have one set of pictures of your heart taken in the AM and a set of pictures in the PM.     PREPARATION FOR TEST:  ? Eat a light breakfast such as water or juice and toast.  ? If you are DIABETIC: Eat a normal breakfast with NO CAFFEINE and take your insulin as normal.   ? AVOID ALL FOODS & DRINKS containing CAFFEINE 12 HOURS PRIOR TO THE TEST: Including coffee, Tea, Trinh and other soft drinks even those labeled  caffeine free or decaffeinated.  ? HOLD THESE MEDICATIONS Persantine & Theophylline (Theodur)  24 hours prior & bring your inhaler with you. Please hold on to these instructions the  will call you within 1-9 business days when we receive authorization from your insurance. Venous Ultrasound    WHAT TO EXPECT:   ? This test will take approximately 60 minutes. ? It is an ultrasound of the veins. ? It can look for blood clots in the extremities or  for venous reflux. ? There is no special instructions for this test.     If you are unable to keep this appointment, please notify us 24 hours prior to test at [phone].     Please be informed that if you contact our office outside of normal business hours the physician on call cannot help with any scheduling or rescheduling issues, procedure instruction questions or any type of medication issue. We advise you for any urgent/emergency that you go to the nearest emergency room! PLEASE CALL OUR OFFICE DURING NORMAL BUSINESS HOURS    Monday - Friday   8 am to 5 pm    [facility]: [phone]    [place]:  [phone]    Please remember to bring all medication bottles or a medication list with you to your appointment. If you have any questions, please call our office at [phone]. CAD:Yes   clinically stable. Patient is on optimal medical regimen ( see medication list above )  -  Patient is currently  asymptomatic from CAD.          - changes in  treatment:   no           - Testing ordered:  no  [place] classification: 1  [place] RISK SCORE:  GAURANG RISK SCORE:  HTN:well controlled on current medical regimen, see list above.            - changes in  treatment:   no   CARDIOMYOPATHY:  known   CONGESTIVE HEART FAILURE: compensated     VHD: S/P Mitral valve repair  DYSLIPIDEMIA: Patient's profile is at / near [facility] is low                                Tolerating current medical regimen wellyes,                                  See most recent Lab values in Labs section above. OTHER RELEVANT DIAGNOSIS:as noted in patient's active problem list:  ? CVI: Probably C4 disease.   TESTS ORDERED: Lexiscan Cardiolite, Venous US & EKG                                      All previously ordered tests reviewed.   ARRHYTHMIAS: H/OTachycardia   MEDICATIONS: CPM

## 2021-04-12 ENCOUNTER — PROCEDURE VISIT (OUTPATIENT)
Dept: CARDIOLOGY CLINIC | Age: 86
End: 2021-04-12
Payer: MEDICARE

## 2021-04-12 DIAGNOSIS — I27.20 PULMONARY HTN (HCC): ICD-10-CM

## 2021-04-12 DIAGNOSIS — I34.0 NONRHEUMATIC MITRAL VALVE REGURGITATION: ICD-10-CM

## 2021-04-12 DIAGNOSIS — I10 ESSENTIAL HYPERTENSION: ICD-10-CM

## 2021-04-12 DIAGNOSIS — I36.1 NONRHEUMATIC TRICUSPID VALVE REGURGITATION: ICD-10-CM

## 2021-04-12 DIAGNOSIS — R60.0 LEG EDEMA: ICD-10-CM

## 2021-04-12 DIAGNOSIS — Z98.61 HISTORY OF PTCA: ICD-10-CM

## 2021-04-12 DIAGNOSIS — Z86.79 H/O CARDIOMYOPATHY: ICD-10-CM

## 2021-04-12 DIAGNOSIS — E78.2 MIXED HYPERLIPIDEMIA: ICD-10-CM

## 2021-04-12 DIAGNOSIS — I38 VHD (VALVULAR HEART DISEASE): ICD-10-CM

## 2021-04-12 DIAGNOSIS — Z95.1 S/P CABG X 1: ICD-10-CM

## 2021-04-12 DIAGNOSIS — I25.10 CORONARY ARTERY DISEASE INVOLVING NATIVE CORONARY ARTERY OF NATIVE HEART WITHOUT ANGINA PECTORIS: ICD-10-CM

## 2021-04-12 LAB
LV EF: 70 %
LVEF MODALITY: NORMAL

## 2021-04-12 PROCEDURE — 93018 CV STRESS TEST I&R ONLY: CPT | Performed by: INTERNAL MEDICINE

## 2021-04-12 PROCEDURE — 78452 HT MUSCLE IMAGE SPECT MULT: CPT | Performed by: INTERNAL MEDICINE

## 2021-04-12 PROCEDURE — 93017 CV STRESS TEST TRACING ONLY: CPT | Performed by: INTERNAL MEDICINE

## 2021-04-12 PROCEDURE — A9500 TC99M SESTAMIBI: HCPCS | Performed by: INTERNAL MEDICINE

## 2021-04-12 PROCEDURE — 93016 CV STRESS TEST SUPVJ ONLY: CPT | Performed by: INTERNAL MEDICINE

## 2021-04-14 ENCOUNTER — TELEPHONE (OUTPATIENT)
Dept: CARDIOLOGY CLINIC | Age: 86
End: 2021-04-14

## 2021-04-14 NOTE — TELEPHONE ENCOUNTER
Patient understood. NM  Normal study.    Normal perfusion study with normal distribution in all coronal, short, and    horizontal axis.    Normal LV function.  LVEF is > 70 %

## 2021-04-26 ENCOUNTER — PROCEDURE VISIT (OUTPATIENT)
Dept: CARDIOLOGY CLINIC | Age: 86
End: 2021-04-26
Payer: MEDICARE

## 2021-04-26 DIAGNOSIS — R60.0 LEG EDEMA: Primary | ICD-10-CM

## 2021-04-26 DIAGNOSIS — I27.20 PULMONARY HTN (HCC): ICD-10-CM

## 2021-04-26 DIAGNOSIS — Z98.61 HISTORY OF PTCA: ICD-10-CM

## 2021-04-26 DIAGNOSIS — I25.10 CORONARY ARTERY DISEASE INVOLVING NATIVE CORONARY ARTERY OF NATIVE HEART WITHOUT ANGINA PECTORIS: ICD-10-CM

## 2021-04-26 DIAGNOSIS — I36.1 NONRHEUMATIC TRICUSPID VALVE REGURGITATION: ICD-10-CM

## 2021-04-26 DIAGNOSIS — Z95.1 S/P CABG X 1: ICD-10-CM

## 2021-04-26 DIAGNOSIS — Z86.79 H/O CARDIOMYOPATHY: ICD-10-CM

## 2021-04-26 DIAGNOSIS — I10 ESSENTIAL HYPERTENSION: ICD-10-CM

## 2021-04-26 DIAGNOSIS — I38 VHD (VALVULAR HEART DISEASE): ICD-10-CM

## 2021-04-26 DIAGNOSIS — I34.0 NONRHEUMATIC MITRAL VALVE REGURGITATION: ICD-10-CM

## 2021-04-26 DIAGNOSIS — E78.2 MIXED HYPERLIPIDEMIA: ICD-10-CM

## 2021-04-26 PROCEDURE — 93970 EXTREMITY STUDY: CPT | Performed by: INTERNAL MEDICINE

## 2021-04-27 ENCOUNTER — TELEPHONE (OUTPATIENT)
Dept: CARDIOLOGY CLINIC | Age: 86
End: 2021-04-27

## 2021-04-27 NOTE — TELEPHONE ENCOUNTER
Called to inform patient of abnormal venous US results. Follow up scheduled for 5/4. Also went over stress test results.

## 2021-05-02 RX ORDER — OMEGA-3-ACID ETHYL ESTERS 1 G/1
1 CAPSULE, LIQUID FILLED ORAL 2 TIMES DAILY
Qty: 180 CAPSULE | Refills: 3 | Status: ON HOLD | OUTPATIENT
Start: 2021-05-02 | End: 2022-08-24 | Stop reason: HOSPADM

## 2021-05-24 ENCOUNTER — OFFICE VISIT (OUTPATIENT)
Dept: CARDIOLOGY CLINIC | Age: 86
End: 2021-05-24
Payer: MEDICARE

## 2021-05-24 VITALS
WEIGHT: 123 LBS | SYSTOLIC BLOOD PRESSURE: 122 MMHG | HEART RATE: 70 BPM | BODY MASS INDEX: 24.15 KG/M2 | DIASTOLIC BLOOD PRESSURE: 70 MMHG | HEIGHT: 60 IN

## 2021-05-24 DIAGNOSIS — Z95.1 S/P CABG X 1: ICD-10-CM

## 2021-05-24 DIAGNOSIS — R60.0 LEG EDEMA: ICD-10-CM

## 2021-05-24 DIAGNOSIS — I87.2 CHRONIC VENOUS INSUFFICIENCY: ICD-10-CM

## 2021-05-24 DIAGNOSIS — I36.1 NONRHEUMATIC TRICUSPID VALVE REGURGITATION: ICD-10-CM

## 2021-05-24 DIAGNOSIS — I34.0 NONRHEUMATIC MITRAL VALVE REGURGITATION: ICD-10-CM

## 2021-05-24 DIAGNOSIS — I38 VHD (VALVULAR HEART DISEASE): ICD-10-CM

## 2021-05-24 DIAGNOSIS — Z86.79 H/O CARDIOMYOPATHY: ICD-10-CM

## 2021-05-24 DIAGNOSIS — I10 ESSENTIAL HYPERTENSION: ICD-10-CM

## 2021-05-24 DIAGNOSIS — Z98.890 H/O MITRAL VALVE REPAIR: ICD-10-CM

## 2021-05-24 DIAGNOSIS — I25.10 CORONARY ARTERY DISEASE INVOLVING NATIVE CORONARY ARTERY OF NATIVE HEART WITHOUT ANGINA PECTORIS: Primary | ICD-10-CM

## 2021-05-24 DIAGNOSIS — Z98.61 HISTORY OF PTCA: ICD-10-CM

## 2021-05-24 PROCEDURE — 1090F PRES/ABSN URINE INCON ASSESS: CPT | Performed by: INTERNAL MEDICINE

## 2021-05-24 PROCEDURE — 1036F TOBACCO NON-USER: CPT | Performed by: INTERNAL MEDICINE

## 2021-05-24 PROCEDURE — G8400 PT W/DXA NO RESULTS DOC: HCPCS | Performed by: INTERNAL MEDICINE

## 2021-05-24 PROCEDURE — G8427 DOCREV CUR MEDS BY ELIG CLIN: HCPCS | Performed by: INTERNAL MEDICINE

## 2021-05-24 PROCEDURE — 99214 OFFICE O/P EST MOD 30 MIN: CPT | Performed by: INTERNAL MEDICINE

## 2021-05-24 PROCEDURE — 1123F ACP DISCUSS/DSCN MKR DOCD: CPT | Performed by: INTERNAL MEDICINE

## 2021-05-24 PROCEDURE — 4040F PNEUMOC VAC/ADMIN/RCVD: CPT | Performed by: INTERNAL MEDICINE

## 2021-05-24 PROCEDURE — G8420 CALC BMI NORM PARAMETERS: HCPCS | Performed by: INTERNAL MEDICINE

## 2021-05-24 NOTE — LETTER
Jessee Almonte Pique Sheets  1935  [id number]    Have you had any Chest Pain that is not new? - No          Have you had any Shortness of Breath -no      Have you had any dizziness - No       Have you had any palpitations that are not new? - No      Is the patient on any of the following medications -   If Yes DO EKG - Needs done every 6 months    Do you have any edema - swelling in legs        When did you have your last labs drawn   Where did you have them done   What doctor ordered     If we do not have these labs you are retrieve these labs for these providers!     Do you have a surgery or procedure scheduled in the near future -        Ask patient if they want to sign up for [employer] if they are not already signed up     Check to see if we have an E-MAIL on file for the patient     Check medication list thoroughly!!! AND RECONCILE OUTSIDE MEDICATIONS  If dose has changed change the entire order not just the MG  BE SURE TO ASK PATIENT IF THEY NEED MEDICATION REFILLS     At check out add to every patient's \"wrap up\" the following dot phrase AFTERHOURSEDUCATION and ensure we explain this to our patients

## 2021-05-24 NOTE — LETTER
[address]  [address] [address]  Phone: [phone]  Fax: [phone]    Dipak Mckeon MD    May 24, 2021     Daryle Angelucci, MD  [place] [address]    Patient: Cheyenne Gordon   MR Number: [id number]   YOB: 1935   Date of Visit: 5/24/2021       Dear Daryle Angelucci:    Thank you for referring Wendie Guerra to me for evaluation/treatment. Below are the relevant portions of my assessment and plan of care. If you have questions, please do not hesitate to call me. I look forward to following Julienne Rollins along with you.     Sincerely,        Dipak Mckeon MD

## 2021-05-24 NOTE — LETTER
Patient Name: Sukhi Ballard  : 1935  MRN# [id number]    REASON FOR VISIT: venous       Current Outpatient Medications   Medication Sig Dispense Refill    omega-3 acid ethyl esters (LOVAZA) 1 g capsule Take 1 capsule by mouth 2 times daily 180 capsule 3    nitroGLYCERIN (NITROSTAT) 0.4 MG SL tablet Place 1 tablet under the tongue every 5 minutes as needed for Chest pain 25 tablet 3    Icosapent Ethyl (VASCEPA) 1 g CAPS capsule Take 2 capsules by mouth 2 times daily 120 capsule 3    sertraline (ZOLOFT) 50 MG tablet TAKE 1 TABLET BY MOUTH EVERY DAY      nebivolol (BYSTOLIC) 20 MG TABS tablet Take 1 tablet by mouth 2 times daily 180 tablet 3    digoxin (LANOXIN) 125 MCG tablet Take 1 tablet by mouth daily 90 tablet 3    cyclobenzaprine (FLEXERIL) 10 MG tablet Take 10 mg by mouth 2 times daily      gabapentin (NEURONTIN) 100 MG capsule Take 100 mg by mouth 3 times daily      amLODIPine (NORVASC) 2.5 MG tablet Take 1 tablet by mouth daily 30 tablet 3    valsartan-hydrochlorothiazide (DIOVAN-HCT) 320-25 MG per tablet Take 1 tablet by mouth daily       HYDROcodone-acetaminophen (VICODIN) 5-500 MG per tablet Take 1 tablet by mouth every 6 hours as needed       Hypromellose (GENTEAL OP) Apply 1-2 drops to eye nightly.  furosemide (LASIX) 20 MG tablet Take 20 mg by mouth daily       estrogens, conjugated, (PREMARIN) 0.9 MG tablet Take 0.9 mg by mouth daily.  multivitamin (THERAGRAN) per tablet Take 1 tablet by mouth daily.  esomeprazole Magnesium (NEXIUM) 40 MG PACK Take 40 mg by mouth daily.  dicyclomine (BENTYL) 20 MG tablet Take 20 mg by mouth nightly.  aspirin 81 MG tablet Take 81 mg by mouth daily.  azelastine (OPTIVAR) 0.05 % ophthalmic solution 1 drop 2 times daily. No current facility-administered medications for this visit.      VL DUP LOWER EXTREMITY VENOUS BILATERAL:2021  No evidence of DVT or SVT in the bilateral common femoral vein, femoral  vein, popliteal vein, greater saphenous vein or small saphenous vein.    Significant reflux noted of the Right SSV prox (4.4s) and mid (1.9s).  Right SSV between mid and distal has calcifications in the lumen which could    hinder passage.    No significant reflux noted in the veins of the left lower extremity.    Left GSV removed previous just after SFJ to knee.         STRESS TEST:  4/12/2021  Normal study.    Normal perfusion study with normal distribution in all coronal, short, and    horizontal axis.    Normal LV function. LVEF is > 70 %. ECHO: NONE    CAROTID: NONE    MUGA: NONE    LAST PACER CHECK: NONE    CARDIAC CATH: NONE    Amio Protocol:    CHADS: [id number] Score for Atrial Fibrillation Stroke Risk   Risk   Factors  Component Value   C CHF No 0   H HTN Yes 1   A2 Age >= 76 Yes,  (80 y.o.) 2   D DM No 0   S2 Prior Stroke/TIA No 0   V Vascular Disease No 0   A Age 74-69 No,  (80 y.o.) 0   Sc Sex female 1    [id number]  Score  4   Score last updated 5/24/21 21:08 AM EDT    Click here for a link to the UpToDate guideline \"Atrial Fibrillation: Anticoagulation therapy to prevent embolization    Disclaimer: Risk Score calculation is dependent on accuracy of patient problem list and past encounter diagnosis.

## 2021-05-24 NOTE — PATIENT INSTRUCTIONS
Please be informed that if you contact our office outside of normal business hours the physician on call cannot help with any scheduling or rescheduling issues, procedure instruction questions or any type of medication issue. We advise you for any urgent/emergency that you go to the nearest emergency room! PLEASE CALL OUR OFFICE DURING NORMAL BUSINESS HOURS    Monday - Friday   8 am to 5 pm    [place]: [phone]    [place]: [phone]    [place]:  [phone]    CAD:Yes   clinically stable. Patient is on optimal medical regimen ( see medication list above )  -  Patient is currently  asymptomatic from CAD.          - changes in  treatment:   no           - Testing ordered:  no  [place] classification: 1  FR[place] RISK SCORE:  GAURANG RISK SCORE:  HTN:well controlled on current medical regimen, see list above.            - changes in  treatment:   no   CARDIOMYOPATHY:  known   CONGESTIVE HEART FAILURE: compensated     VHD: S/P Mitral valve repair  DYSLIPIDEMIA: Patient's profile is at / near [facility] is low                                Tolerating current medical regimen wellyes,                                  See most recent Lab values in Labs section above. 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 TESTS ORDERED: Ablation of right SSV                                      All previously ordered tests reviewed.   ARRHYTHMIAS: H/OTachycardia   MEDICATIONS: CPM   Office f/u in a month after the procedure.

## 2021-05-24 NOTE — PROGRESS NOTES
3    valsartan-hydrochlorothiazide (DIOVAN-HCT) 320-25 MG per tablet Take 1 tablet by mouth daily       HYDROcodone-acetaminophen (VICODIN) 5-500 MG per tablet Take 1 tablet by mouth every 6 hours as needed       Hypromellose (GENTEAL OP) Apply 1-2 drops to eye nightly.  furosemide (LASIX) 20 MG tablet Take 20 mg by mouth daily       estrogens, conjugated, (PREMARIN) 0.9 MG tablet Take 0.9 mg by mouth daily.  multivitamin (THERAGRAN) per tablet Take 1 tablet by mouth daily.  esomeprazole Magnesium (NEXIUM) 40 MG PACK Take 40 mg by mouth daily.  dicyclomine (BENTYL) 20 MG tablet Take 20 mg by mouth nightly.  aspirin 81 MG tablet Take 81 mg by mouth daily.  azelastine (OPTIVAR) 0.05 % ophthalmic solution 1 drop 2 times daily. No current facility-administered medications for this visit. Allergies: Niaspan [niacin er], Clopidogrel, and Hyzaar [losartan potassium-hctz]  Review of Systems:    Constitutional: Negative for diaphoresis and fatigue  Respiratory: Negative for shortness of breath  Cardiovascular: Negative for chest pain, dyspnea on exertion, claudication, edema, irregular heartbeat, murmur, palpitations or shortness of breath  Musculoskeletal: Negative for muscle pain, muscular weakness, negative for pain in arm and leg or swelling in foot and leg    Objective: There were no vitals taken for this visit. Wt Readings from Last 3 Encounters:   03/29/21 122 lb (55.3 kg)   11/12/18 122 lb 6.4 oz (55.5 kg)   06/11/18 124 lb 9.6 oz (56.5 kg)     There is no height or weight on file to calculate BMI. GENERAL - Alert, oriented, pleasant, in no apparent distress. EYES: No jaundice, no conjunctival pallor. Neck - Supple. No jugular venous distention noted. No carotid bruits. Cardiovascular - Normal S1 and S2 without obvious murmur or gallop. Extremities - No cyanosis, clubbing, or significant edema. Pulmonary - No respiratory distress.   No wheezes or rosalino. Lab Review   No results found for: TROPONINT  Lab Results   Component Value Date    BNP 72 08/10/2015    BNP 72 08/10/2015     No results found for: INR  Lab Results   Component Value Date    LABA1C 5.5 10/16/2017    LABA1C 6.2 10/18/2016     Lab Results   Component Value Date    WBC 6.4 10/16/2017    WBC 8.0 10/18/2016    HCT 42.1 10/16/2017    HCT 39.7 10/18/2016    MCV 96.8 10/16/2017    MCV 95.8 09/02/2014     10/16/2017     10/18/2016     Lab Results   Component Value Date    CHOL 160 06/14/2018    CHOL 203 10/16/2017    TRIG 281 (H) 06/14/2018    TRIG 409 10/16/2017    HDL 42 06/14/2018    HDL 41 10/16/2017    LDLCALC 69 07/06/2016    LDLCALC 55 09/02/2014    LDLDIRECT 90 06/14/2018    LDLDIRECT 55 11/02/2010     Lab Results   Component Value Date    ALT 13 06/14/2018    ALT 12 10/16/2017    AST 20 06/14/2018    AST 15 10/16/2017     BMP:    Lab Results   Component Value Date     10/16/2017     10/18/2016    K 4.5 10/16/2017    K 4.2 10/18/2016    CL 97 10/16/2017    CL 95 10/18/2016    CO2 28 10/16/2017    CO2 29 10/18/2016    BUN 64 10/16/2017    BUN 52 10/18/2016    CREATININE 1.2 10/16/2017    CREATININE 1.2 10/18/2016     CMP:   Lab Results   Component Value Date     10/16/2017     10/18/2016    K 4.5 10/16/2017    K 4.2 10/18/2016    CL 97 10/16/2017    CL 95 10/18/2016    CO2 28 10/16/2017    CO2 29 10/18/2016    BUN 64 10/16/2017    BUN 52 10/18/2016    CREATININE 1.2 10/16/2017    CREATININE 1.2 10/18/2016    PROT 7.0 06/14/2018     Lab Results   Component Value Date    TSH 2.8 09/02/2014     VENOUS US 4/2021    No evidence of DVT or SVT in the bilateral common femoral vein, femoral    vein, popliteal vein, greater saphenous vein or small saphenous vein.    Significant reflux noted of the Right SSV prox (4.4s) and mid (1.9s).     Right SSV between mid and distal has calcifications in the lumen which could    hinder passage.    No significant reflux noted in the veins of the left lower extremity.    Left GSV removed previous just after SFJ to knee. QUALITY MEASURES REVIEWED:  1.CAD:Patient is taking anti platelet agent:Yes  Patient does not have Hx of documented CAD  2. DYSLIPIDEMIA: Patient is on cholesterol lowering medication:Yes   Patient does not tolerate, secondary to side-effects. 3.Beta-Blocker therapy for CAD, if prior Myocardial Infarction:Yes   Due to side-effect(s) / Bradycardia  4. Counselled regarding smoking cessation. No   Patient does not Smoke. 5.Anticoagulation therapy (for A.Fib) No   Does Not have A.Fib. Patient has contraindication ( Fall Risk / GI Bleed ). 6.Discussed weight management strategies. Assessment & Plan:    Primary / Secondary prevention is the goal by aggressive risk modification, healthy and therapeutic life style changes for cardiovascular risk reduction. Various goals are discussed and multiple questions answered. CAD:Yes   clinically stable. Patient is on optimal medical regimen ( see medication list above )  -  Patient is currently  asymptomatic from CAD.          - changes in  treatment:   no           - Testing ordered:  no  [place] classification: 1  FRAM[place]AM RISK SCORE:  GAURANG RISK SCORE:  HTN:well controlled on current medical regimen, see list above.            - changes in  treatment:   no   CARDIOMYOPATHY:  known   CONGESTIVE HEART FAILURE: compensated     VHD: S/P Mitral valve repair  DYSLIPIDEMIA: Patient's profile is at / near [email or web address] is low                                Tolerating current medical regimen wellyes,                                  See most recent Lab values in Labs section above. 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   TESTS ORDERED: Ablation of right SSV                                      All previously ordered tests reviewed.   ARRHYTHMIAS: H/OTachycardia   MEDICATIONS: CPM   Office f/u in a month after the procedure.

## 2021-07-06 RX ORDER — NEBIVOLOL 20 MG/1
20 TABLET ORAL 2 TIMES DAILY
Qty: 180 TABLET | Refills: 3 | Status: SHIPPED | OUTPATIENT
Start: 2021-07-06 | End: 2022-04-29

## 2021-07-06 RX ORDER — DIGOXIN 125 MCG
125 TABLET ORAL DAILY
Qty: 90 TABLET | Refills: 3 | Status: SHIPPED | OUTPATIENT
Start: 2021-07-06 | End: 2022-04-19

## 2021-10-16 ENCOUNTER — APPOINTMENT (OUTPATIENT)
Dept: CT IMAGING | Age: 86
End: 2021-10-16
Payer: MEDICARE

## 2021-10-16 ENCOUNTER — HOSPITAL ENCOUNTER (EMERGENCY)
Age: 86
Discharge: LEFT BEFORE DISCHARGE | End: 2021-10-16
Attending: EMERGENCY MEDICINE
Payer: MEDICARE

## 2021-10-16 VITALS
TEMPERATURE: 98.1 F | RESPIRATION RATE: 14 BRPM | BODY MASS INDEX: 23.56 KG/M2 | HEIGHT: 60 IN | OXYGEN SATURATION: 98 % | WEIGHT: 120 LBS | HEART RATE: 76 BPM | DIASTOLIC BLOOD PRESSURE: 45 MMHG | SYSTOLIC BLOOD PRESSURE: 156 MMHG

## 2021-10-16 DIAGNOSIS — Z53.21 ELOPED FROM EMERGENCY DEPARTMENT: ICD-10-CM

## 2021-10-16 DIAGNOSIS — R53.1 GENERALIZED WEAKNESS: Primary | ICD-10-CM

## 2021-10-16 PROCEDURE — 99283 EMERGENCY DEPT VISIT LOW MDM: CPT

## 2021-10-16 PROCEDURE — 70450 CT HEAD/BRAIN W/O DYE: CPT

## 2021-10-16 PROCEDURE — 71250 CT THORAX DX C-: CPT

## 2021-10-16 PROCEDURE — 76376 3D RENDER W/INTRP POSTPROCES: CPT

## 2021-10-16 PROCEDURE — 93005 ELECTROCARDIOGRAM TRACING: CPT | Performed by: EMERGENCY MEDICINE

## 2021-10-16 PROCEDURE — 72128 CT CHEST SPINE W/O DYE: CPT

## 2021-10-16 PROCEDURE — 74176 CT ABD & PELVIS W/O CONTRAST: CPT

## 2021-10-16 PROCEDURE — 72125 CT NECK SPINE W/O DYE: CPT

## 2021-10-16 ASSESSMENT — ENCOUNTER SYMPTOMS
VOMITING: 0
RHINORRHEA: 0
DIARRHEA: 0
COUGH: 0
SORE THROAT: 0
SHORTNESS OF BREATH: 0
NAUSEA: 0
COLOR CHANGE: 1
BACK PAIN: 1
ABDOMINAL PAIN: 0

## 2021-10-16 NOTE — ED TRIAGE NOTES
Patient has been having a difficult time walking around with her walker. Patient feel about 7 to 10 days ago.

## 2021-10-16 NOTE — ED PROVIDER NOTES
I independently examined and evaluated Cas Craig. In brief, 35-year-old female presents complaining of some generalized weakness and difficulty ambulating. She does have frequent falls at home and also had a fall about 1 week ago. She does use a walker at baseline. She lives at home alone. She reports that since her fall about 1 week ago she has been \"dragging herself\" around her home. Denies any saddle paresthesias. No bowel or urinary incontinence. Denies any remarkable shortness of breath. She is grossly moving all extremities in the emergency department. No external signs of trauma. .    Focused exam revealed wrist resist gravity with all extremities, pelvis stable, abdomen soft and nontender, no signs of respiratory distress. Respirations unlabored. .    ED course: We did obtain labs as well as various CT scans to evaluate for any traumatic injuries. Obtain a CT head, CT C-spine, CT of her chest abdomen pelvis as well as thoracic and lumbar recons. The CT of her abdomen pelvis does show some sacral fractures of indeterminate age. Patient was reading in the emergency department for admission. We had consulted case management given her poor functional status at home and likely need for PT OT and rehab or nursing home placement. However patient did elope from the emergency department. We were told that family came to get her to take her to another facility. She eloped before her work-up and admission were complete. .  EKG is interpreted by me. EKG shows sinus rhythm at 74 bpm, axis is nondeviated, she does have some ST segment depressions in in inferior and precordial leads, side. Tibias over unremarkable, FL interval 144, QRS duration of 84, QTC of 30-75. Final impression, nonspecific EKG. From review of records, prior EKGs seem to show similar morphology in the ST segments.     All diagnostic, treatment, and disposition decisions were made by myself in conjunction with the advanced practice provider. For all further details of the patient's emergency department visit, please see the advanced practice provider's documentation. Comment: Please note this report has been produced using speech recognition software and may contain errors related to that system including errors in grammar, punctuation, and spelling, as well as words and phrases that may be inappropriate. If there are any questions or concerns please feel free to contact the dictating provider for clarification.        Suzy Coleman MD  10/16/21 1950

## 2021-10-16 NOTE — ED PROVIDER NOTES
[address] ENCOUNTER        Pt Name: Swapna Alves  MRN: [id number]  [place] 1935  Date of evaluation: 10/16/2021  Provider: Elsa Loza PA-C  PCP: Andrew Velazquez MD  Note Started: 1:12 PM EDT       I have seen and evaluated this patient with my supervising physician Terie Phoenix, MD.      Triage CHIEF COMPLAINT       Chief Complaint   Patient presents with    Other     trouble standing         HISTORY OF PRESENT ILLNESS   (Location/Symptom, Timing/Onset, Context/Setting, Quality, Duration, Modifying Factors, Severity)  Note limiting factors. Chief Complaint: Difficulty walking  Swapna Alves is a 80 y.o. female who presents with complaint difficulty walking. She admitted describes she typically uses a walker, however over the past couple weeks she has had more difficulty walking, and specifically worse today. She states that she lives alone, typically uses a walker, she mentions 2 weeks ago she had fallen, and since then has had some persistent back pain. Her legs had become more weak today and she had more trouble standing which prompted her family to encourage her to come to the emergency department. She denies any headache, neck pain, chest pain, shortness of breath, vertigo, sob,  abdominal pain, nausea, vomiting, fever, new urinary symptoms. Nursing Notes were all reviewed and agreed with or any disagreements were addressed in the HPI. REVIEW OF SYSTEMS    (2-9 systems for level 4, 10 or more for level 5)     Review of Systems   Constitutional: Negative for chills and fever. HENT: Negative for rhinorrhea and sore throat. Respiratory: Negative for cough and shortness of breath. Cardiovascular: Negative for chest pain and palpitations. Gastrointestinal: Negative for abdominal pain, diarrhea, nausea and vomiting.    Genitourinary: Positive for difficulty urinating (Incontinence-chronic). Musculoskeletal: Positive for back pain. Negative for myalgias and neck pain. Skin: Positive for color change. Neurological: Positive for weakness. Negative for dizziness, syncope, light-headedness and headaches. PAST MEDICAL HISTORY     Past Medical History:   Diagnosis Date    CAD (coronary artery disease)     Cancer ([facility] [address]     UTERINE    CRI (chronic renal insufficiency) 03/2010    STAGE 3 RENAL DISEASE    DJD (degenerative joint disease)     H/O cardiomyopathy     H/O cardiovascular stress test 9-9-14,    5- 9/14 ([facility]) NORMAL STUDY. EF 70%.  H/O cataract     BILATERAL    H/O echocardiogram 5-6-13 08- 5/13EF=>55%. MOD TR and mild MR.     8/10 BORDERLINE CONCENTRIC LEFT VENTRICULAR HYPERTROPHY. ABN. LEFT VENT. DIASTOLIC FUNCTION STAGE 1. MILD MITRIL ANNULAR CALCIFICATION. MILD TRICUSPID REGURG. NOTED.    H/O echocardiogram 08/12/2015    EF >55%. Normal LV systolic function. Severe pulm HTN. Mod-severe TR. Mild MR.     H/O hiatal hernia     H/O mitral valve repair 07/2010    H/O transesophageal echocardiography ([facility]) for monitoring 06/30/2010    3-4+ MITRIL REGURG. AND MILD TRICUSPID REGURG.  History of IBS     History of PTCA 11/23/2009    STENT (3.0 X 12 PROMUS) RCA. MILD OSTIAL DISEASE LM. CRITICAL OSTIAL DISEASE RCA. ELEVATED SYSTEMIC PRESSURES AT REST. MILD MITRIL [address]  History of stress test 04/12/2021    normal    Hx of cardiovascular stress test 08/17/2015    cardiolite-normal    Hx of Doppler echocardiogram 05/06/2013    EF55%  mild mitral regurg, moderate tricuspid regurg, right ventricular systolic pressure elevated at 60mmHg    Hx of Doppler echocardiogram 06/25/2018    LV function and size are normal, Ejection Fraction 50-55 %. Normal left ventricular wall thickness. No regional wall motion abnormalities were detected. Normal diastolic filling pattern for age.    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 CABG X 1 W/SVG TO RCA.  FOOT SURGERY  10-    LEFT    HYSTERECTOMY  1990   [address] AND 2001    BILATERAL HIPS       [address]       Discharge Medication List as of 10/16/2021  7:12 PM      CONTINUE these medications which have NOT CHANGED    Details   nebivolol (BYSTOLIC) 20 MG TABS tablet Take 1 tablet by mouth 2 times daily, Disp-180 tablet, R-3Normal      digoxin (LANOXIN) 125 MCG tablet Take 1 tablet by mouth daily, Disp-90 tablet, R-3Normal      omega-3 acid ethyl esters (LOVAZA) 1 g capsule Take 1 capsule by mouth 2 times daily, Disp-180 capsule, R-3Normal      nitroGLYCERIN (NITROSTAT) 0.4 MG SL tablet Place 1 tablet under the tongue every 5 minutes as needed for Chest pain, Disp-25 tablet, R-3Normal      Icosapent Ethyl (VASCEPA) 1 g CAPS capsule Take 2 capsules by mouth 2 times daily, Disp-120 capsule,R-3Normal      sertraline (ZOLOFT) 50 MG tablet TAKE 1 TABLET BY MOUTH EVERY DAYHistorical Med      cyclobenzaprine (FLEXERIL) 10 MG tablet Take 10 mg by mouth 2 times daily      gabapentin (NEURONTIN) 100 MG capsule Take 100 mg by mouth 3 times daily      amLODIPine (NORVASC) 2.5 MG tablet Take 1 tablet by mouth daily, Disp-30 tablet, R-3      valsartan-hydrochlorothiazide (DIOVAN-HCT) 320-25 MG per tablet Take 1 tablet by mouth daily Historical Med      HYDROcodone-acetaminophen (VICODIN) 5-500 MG per tablet Take 1 tablet by mouth every 6 hours as needed       Hypromellose (GENTEAL OP) Apply 1-2 drops to eye nightly.       furosemide (LASIX) 20 MG tablet Take 20 mg by mouth daily Historical Med      estrogens, conjugated, (PREMARIN) 0.9 MG tablet Take 0.9 mg by mouth daily. multivitamin (THERAGRAN) per tablet Take 1 tablet by mouth daily. esomeprazole Magnesium (NEXIUM) 40 MG PACK Take 40 mg by mouth daily. dicyclomine (BENTYL) 20 MG tablet Take 20 mg by mouth nightly. aspirin 81 MG tablet Take 81 mg by mouth daily. azelastine (OPTIVAR) 0.05 % ophthalmic solution 1 drop 2 times daily. ALLERGIES     Niaspan [niacin er], Clopidogrel, and Hyzaar [losartan potassium-hctz]    FAMILYHISTORY       Family History   Problem Relation Age of Onset    High Blood Pressure Mother     Heart Disease Father     High Blood Pressure Father     Cancer Brother         COLON        SOCIAL HISTORY       Social History     Socioeconomic History    Marital status:      Spouse name: Not on file    Number of children: Not on file    Years of education: Not on file    Highest education level: Not on file   Occupational History    Not on file   Tobacco Use    Smoking status: Never Smoker    Smokeless tobacco: Never Used    Tobacco comment: Reviewed 11/1/16   Substance and Sexual Activity    Alcohol use: No     Alcohol/week: 0.0 standard drinks    Drug use: No    Sexual activity: Yes     Partners: Male     Comment:  62 years   Other Topics Concern    Not on file   Social History Narrative    Not on file     Social Determinants of Health     Financial Resource Strain:     Difficulty of Paying Living Expenses:    Food Insecurity:     Worried About Running Out of Food in the Last Year:     [address] in the Last Year:    Transportation Needs:     Lack of Transportation (Medical):      Lack of Transportation (Non-Medical):    Physical Activity:     Days of Exercise per Week:     Minutes of Exercise per Session:    Stress:     Feeling of Stress :    Social Connections:     Frequency of Communication Radiologist below, if available at the time of this note:    CT THORACIC SPINE WO CONTRAST   Preliminary Result   Displaced fracture at the S4/S5 level which is age indeterminate. Correlate   for focal tenderness. Fracture acuity could be further assessed with MRI as   clinically indicated. No acute fracture or traumatic malalignment is appreciated in the thoracic or   lumbar spine. 7 mm anterolisthesis of C7 on T1 is favored to be   chronic/degenerative in nature. Multilevel degenerative changes throughout   the spine. No acute traumatic abnormality is identified in the chest, abdomen, or pelvis. CT LUMBAR RECONSTRUCTION WO POST PROCESS   Preliminary Result   Displaced fracture at the S4/S5 level which is age indeterminate. Correlate   for focal tenderness. Fracture acuity could be further assessed with MRI as   clinically indicated. No acute fracture or traumatic malalignment is appreciated in the thoracic or   lumbar spine. 7 mm anterolisthesis of C7 on T1 is favored to be   chronic/degenerative in nature. Multilevel degenerative changes throughout   the spine. No acute traumatic abnormality is identified in the chest, abdomen, or pelvis. CT ABDOMEN PELVIS WO CONTRAST Additional Contrast? None   Preliminary Result   Displaced fracture at the S4/S5 level which is age indeterminate. Correlate   for focal tenderness. Fracture acuity could be further assessed with MRI as   clinically indicated. No acute fracture or traumatic malalignment is appreciated in the thoracic or   lumbar spine. 7 mm anterolisthesis of C7 on T1 is favored to be   chronic/degenerative in nature. Multilevel degenerative changes throughout   the spine. No acute traumatic abnormality is identified in the chest, abdomen, or pelvis. CT CHEST WO CONTRAST   Preliminary Result   Displaced fracture at the S4/S5 level which is age indeterminate. Correlate   for focal tenderness. Fracture acuity could be further assessed with MRI as   clinically indicated. No acute fracture or traumatic malalignment is appreciated in the thoracic or   lumbar spine. 7 mm anterolisthesis of C7 on T1 is favored to be   chronic/degenerative in nature. Multilevel degenerative changes throughout   the spine. No acute traumatic abnormality is identified in the chest, abdomen, or pelvis. CT CERVICAL SPINE WO CONTRAST   Final Result   1. No acute fracture. 2. Severe cervical degenerative disc and joint disease. Grade 1   anterolisthesis of C7 with respect to T1 that is likely related to   degenerative arthropathy. CT HEAD WO CONTRAST   Final Result   No acute findings in the head. No results found. PROCEDURES   Unless otherwise noted below, none     Procedures    CRITICAL CARE TIME   N/A    CONSULTS:  IP CONSULT TO CASE MANAGEMENT      EMERGENCY DEPARTMENT COURSE and DIFFERENTIAL DIAGNOSIS/MDM:   Vitals:    Vitals:    10/16/21 1105   BP: (!) 156/45   Pulse: 76   Resp: 14   Temp: 98.1 °F (36.7 °C)   TempSrc: Oral   SpO2: 98%   Weight: 120 lb (54.4 kg)   Height: 5' (1.524 m)       Patient was given thefollowing medications:  Medications - No data to display  ED Course as of Oct 16 1950   Sat Oct 16, 2021   1312 CT CHEST WO CONTRAST [GA]   26 CT ABDOMEN PELVIS WO CONTRAST Additional Contrast? None [GA]   1406 CT HEAD WO CONTRAST [GA]   1406 CT CERVICAL SPINE WO CONTRAST [GA]      ED Course User Index  [GA] Lashawn Deshpande PA-C        Patient presented with above HPI. Due to high volume in the emergency department, patient initially seen in waiting room. She was alert and oriented and answers questions appropriately. She describes generalized weakness difficulty walking, and acute back pain. CT imaging had been ordered. Age indeterminant S5 fracture, otherwise no acute findings on CT cervical, thoracic, lumbar spine CTs, and chest abdomen pelvis.   Given social situation where patient is living alone, will consult case management to assist with disposition. Patient's lab work pending at this time. @19:30 patient blood work still pending, additionally, patient has been in the waiting room awaiting a exam bed. However reportedly patient had eloped before further treatment, and before further examination in exam room. Reportedly patient's family had arrived and take patient home. Prior to patient leaving the department, but before blood work, I did discuss with attending Dr. Stefano Montanez, who also saw patient in exam room. Plan was for admission for placement. FINAL IMPRESSION      1. Generalized weakness    2. Eloped from emergency department          DISPOSITION/PLAN   DISPOSITION        PATIENT REFERREDTO:  No follow-up provider specified.     DISCHARGE MEDICATIONS:  Discharge Medication List as of 10/16/2021  7:12 PM          DISCONTINUED MEDICATIONS:  Discharge Medication List as of 10/16/2021  7:12 PM                 (Please note that portions ofthis note were completed with a voice recognition program.  Efforts were made to edit the dictations but occasionally words are mis-transcribed.)    Nuzhat Schwartz PA-C (electronically signed)            Nuzhat Schwartz PA-C  10/16/21 800 [address]  10/16/21 1958

## 2021-10-17 LAB
EKG ATRIAL RATE: 74 BPM
EKG DIAGNOSIS: NORMAL
EKG P AXIS: 66 DEGREES
EKG P-R INTERVAL: 144 MS
EKG Q-T INTERVAL: 338 MS
EKG QRS DURATION: 84 MS
EKG QTC CALCULATION (BAZETT): 375 MS
EKG R AXIS: 31 DEGREES
EKG T AXIS: 79 DEGREES
EKG VENTRICULAR RATE: 74 BPM

## 2021-10-17 PROCEDURE — 93010 ELECTROCARDIOGRAM REPORT: CPT | Performed by: INTERNAL MEDICINE

## 2021-11-09 ENCOUNTER — OFFICE VISIT (OUTPATIENT)
Dept: CARDIOLOGY CLINIC | Age: 86
End: 2021-11-09
Payer: MEDICARE

## 2021-11-09 VITALS
DIASTOLIC BLOOD PRESSURE: 70 MMHG | HEART RATE: 72 BPM | BODY MASS INDEX: 16.41 KG/M2 | HEIGHT: 60 IN | WEIGHT: 83.6 LBS | SYSTOLIC BLOOD PRESSURE: 136 MMHG

## 2021-11-09 DIAGNOSIS — I10 PRIMARY HYPERTENSION: ICD-10-CM

## 2021-11-09 DIAGNOSIS — I36.1 NONRHEUMATIC TRICUSPID VALVE REGURGITATION: ICD-10-CM

## 2021-11-09 DIAGNOSIS — I38 VHD (VALVULAR HEART DISEASE): ICD-10-CM

## 2021-11-09 DIAGNOSIS — I25.10 CORONARY ARTERY DISEASE INVOLVING NATIVE CORONARY ARTERY OF NATIVE HEART WITHOUT ANGINA PECTORIS: Primary | ICD-10-CM

## 2021-11-09 DIAGNOSIS — I87.2 CHRONIC VENOUS INSUFFICIENCY: ICD-10-CM

## 2021-11-09 DIAGNOSIS — Z98.61 HISTORY OF PTCA: ICD-10-CM

## 2021-11-09 DIAGNOSIS — I34.0 NONRHEUMATIC MITRAL VALVE REGURGITATION: ICD-10-CM

## 2021-11-09 DIAGNOSIS — Z95.1 S/P CABG X 1: ICD-10-CM

## 2021-11-09 DIAGNOSIS — E78.2 MIXED HYPERLIPIDEMIA: ICD-10-CM

## 2021-11-09 DIAGNOSIS — Z98.890 H/O MITRAL VALVE REPAIR: ICD-10-CM

## 2021-11-09 PROCEDURE — G8419 CALC BMI OUT NRM PARAM NOF/U: HCPCS | Performed by: INTERNAL MEDICINE

## 2021-11-09 PROCEDURE — G8427 DOCREV CUR MEDS BY ELIG CLIN: HCPCS | Performed by: INTERNAL MEDICINE

## 2021-11-09 PROCEDURE — 1090F PRES/ABSN URINE INCON ASSESS: CPT | Performed by: INTERNAL MEDICINE

## 2021-11-09 PROCEDURE — 1036F TOBACCO NON-USER: CPT | Performed by: INTERNAL MEDICINE

## 2021-11-09 PROCEDURE — G8484 FLU IMMUNIZE NO ADMIN: HCPCS | Performed by: INTERNAL MEDICINE

## 2021-11-09 PROCEDURE — 1123F ACP DISCUSS/DSCN MKR DOCD: CPT | Performed by: INTERNAL MEDICINE

## 2021-11-09 PROCEDURE — 4040F PNEUMOC VAC/ADMIN/RCVD: CPT | Performed by: INTERNAL MEDICINE

## 2021-11-09 PROCEDURE — 99212 OFFICE O/P EST SF 10 MIN: CPT | Performed by: INTERNAL MEDICINE

## 2021-11-09 NOTE — PROGRESS NOTES
OFFICE PROGRESS NOTES      Barby Garcia is a 80 y.o. female who has    CHIEF COMPLAINT AS FOLLOWS:  CHEST PAIN: Patient denies any C/O chest pains at this time.      SOB: No C/O SOB at this time.                 LEG EDEMA: + leg edema   PALPITATIONS: Denies any C/O Palpitations                                 DIZZINESS: No C/O Dizziness                      SYNCOPE: None   OTHER: Loosing weight                                    HPI: Patient is here for F/U on his CAD, VHD, CVI,  Arrhythmia, HTN & Dyslipidemia problems. CAD: Patient has known CAD. Had angioplasty / CABG in the past.  VHD: Patient has known mitral / aortic valve disease. S/P Mitral valve repair . CVI: Has C4 disease with significant Reflux of right SSV. Arrhythmia: Patient has known tachycardia. HTN: Patient has known essential HTN. Has been treated with guideline recommended medical / physical/ diet therapy as stated below. Dyslipidemia: Patient has known mixed dyslipidemia. Has been treated with guideline recommended medical / physical/ diet therapy as stated below.                 Current Outpatient Medications   Medication Sig Dispense Refill    nebivolol (BYSTOLIC) 20 MG TABS tablet Take 1 tablet by mouth 2 times daily 180 tablet 3    digoxin (LANOXIN) 125 MCG tablet Take 1 tablet by mouth daily 90 tablet 3    omega-3 acid ethyl esters (LOVAZA) 1 g capsule Take 1 capsule by mouth 2 times daily 180 capsule 3    nitroGLYCERIN (NITROSTAT) 0.4 MG SL tablet Place 1 tablet under the tongue every 5 minutes as needed for Chest pain 25 tablet 3    Icosapent Ethyl (VASCEPA) 1 g CAPS capsule Take 2 capsules by mouth 2 times daily 120 capsule 3    sertraline (ZOLOFT) 50 MG tablet TAKE 1 TABLET BY MOUTH EVERY DAY      cyclobenzaprine (FLEXERIL) 10 MG tablet Take 10 mg by mouth 2 times daily      gabapentin (NEURONTIN) 100 MG capsule Take 100 mg by mouth 3 times daily      amLODIPine (NORVASC) 2.5 MG tablet Take 1 tablet by mouth daily 30 tablet 3    valsartan-hydrochlorothiazide (DIOVAN-HCT) 320-25 MG per tablet Take 1 tablet by mouth daily       HYDROcodone-acetaminophen (VICODIN) 5-500 MG per tablet Take 1 tablet by mouth every 6 hours as needed       Hypromellose (GENTEAL OP) Apply 1-2 drops to eye nightly.  furosemide (LASIX) 20 MG tablet Take 20 mg by mouth daily       estrogens, conjugated, (PREMARIN) 0.9 MG tablet Take 0.9 mg by mouth daily.  multivitamin (THERAGRAN) per tablet Take 1 tablet by mouth daily.  esomeprazole Magnesium (NEXIUM) 40 MG PACK Take 40 mg by mouth daily.  dicyclomine (BENTYL) 20 MG tablet Take 20 mg by mouth nightly.  aspirin 81 MG tablet Take 81 mg by mouth daily.  azelastine (OPTIVAR) 0.05 % ophthalmic solution 1 drop 2 times daily. No current facility-administered medications for this visit. Allergies: Niaspan [niacin er], Clopidogrel, and Hyzaar [losartan potassium-hctz]  Review of Systems:    Constitutional: Negative for diaphoresis and fatigue  Respiratory: Negative for shortness of breath  Cardiovascular: Negative for chest pain, dyspnea on exertion, claudication, edema, irregular heartbeat, murmur, palpitations or shortness of breath  Musculoskeletal: Negative for muscle pain, muscular weakness, negative for pain in arm and leg or swelling in foot and leg    Objective:  /70 (Site: Left Upper Arm, Position: Sitting, Cuff Size: Medium Adult)   Pulse 72   Ht 5' (1.524 m)   Wt 83 lb 9.6 oz (37.9 kg)   BMI 16.33 kg/m²   Wt Readings from Last 3 Encounters:   11/09/21 83 lb 9.6 oz (37.9 kg)   10/16/21 120 lb (54.4 kg)   05/24/21 123 lb (55.8 kg)     Body mass index is 16.33 kg/m². GENERAL - Alert, oriented, pleasant, in no apparent distress. EYES: No jaundice, no conjunctival pallor. Neck - Supple. No jugular venous distention noted. No carotid bruits. Cardiovascular - Normal S1 and S2 without obvious murmur or gallop.     Extremities - No cyanosis, clubbing, or significant edema. Pulmonary - No respiratory distress. No wheezes or rales. Lab Review   No results found for: TROPONINT  Lab Results   Component Value Date    BNP 72 08/10/2015    BNP 72 08/10/2015     No results found for: INR  Lab Results   Component Value Date    LABA1C 5.5 10/16/2017    LABA1C 6.2 10/18/2016     Lab Results   Component Value Date    WBC 6.4 10/16/2017    WBC 8.0 10/18/2016    HCT 42.1 10/16/2017    HCT 39.7 10/18/2016    MCV 96.8 10/16/2017    MCV 95.8 09/02/2014     10/16/2017     10/18/2016     Lab Results   Component Value Date    CHOL 160 06/14/2018    CHOL 203 10/16/2017    TRIG 281 (H) 06/14/2018    TRIG 409 10/16/2017    HDL 42 06/14/2018    HDL 41 10/16/2017    LDLCALC 69 07/06/2016    LDLCALC 55 09/02/2014    LDLDIRECT 90 06/14/2018    LDLDIRECT 55 11/02/2010     Lab Results   Component Value Date    ALT 13 06/14/2018    ALT 12 10/16/2017    AST 20 06/14/2018    AST 15 10/16/2017     BMP:    Lab Results   Component Value Date     10/16/2017     10/18/2016    K 4.5 10/16/2017    K 4.2 10/18/2016    CL 97 10/16/2017    CL 95 10/18/2016    CO2 28 10/16/2017    CO2 29 10/18/2016    BUN 64 10/16/2017    BUN 52 10/18/2016    CREATININE 1.2 10/16/2017    CREATININE 1.2 10/18/2016     CMP:   Lab Results   Component Value Date     10/16/2017     10/18/2016    K 4.5 10/16/2017    K 4.2 10/18/2016    CL 97 10/16/2017    CL 95 10/18/2016    CO2 28 10/16/2017    CO2 29 10/18/2016    BUN 64 10/16/2017    BUN 52 10/18/2016    CREATININE 1.2 10/16/2017    CREATININE 1.2 10/18/2016    PROT 7.0 06/14/2018     Lab Results   Component Value Date    TSH 2.8 09/02/2014     CARDIOLITE 4/2021    Normal study.    Normal perfusion study with normal distribution in all coronal, short, and    horizontal axis.    Normal LV function. LVEF is > 70 %. ECHO 6/2018   LV function and size are normal, Ejection Fraction 50-55 %.    Normal left ventricular wall thickness. No regional wall motion abnormalities were detected. Normal diastolic filling pattern for age. Mildly enlarged right ventricle cavity. Mitral valve repair noted. Moderate-to-severe tricuspid regurgitation. Right ventricular systolic pressure of 75 mm Hg consistent with moderate to   severe pulmonary hypertension. No evidence of pericardial effusion    VENOUS US 4/2021    No evidence of DVT or SVT in the bilateral common femoral vein, femoral    vein, popliteal vein, greater saphenous vein or small saphenous vein.    Significant reflux noted of the Right SSV prox (4.4s) and mid (1.9s).  Right SSV between mid and distal has calcifications in the lumen which could    hinder passage.    No significant reflux noted in the veins of the left lower extremity.    Left GSV removed previous just after SFJ to knee. QUALITY MEASURES REVIEWED:  1.CAD:Patient is taking anti platelet agent:Yes  2. DYSLIPIDEMIA: Patient is on cholesterol lowering medication:Yes   3. Beta-Blocker therapy for CAD, if prior Myocardial Infarction:No   4. Counselled regarding smoking cessation. No   Patient does not Smoke. 5.Anticoagulation therapy (for A.Fib) No   Does Not have A.Fib.  6.Discussed weight management strategies. Assessment & Plan:  Primary / Secondary prevention is the goal by aggressive risk modification, healthy and therapeutic life style changes for cardiovascular risk reduction. CORONARY ARTERY DISEASE:Yes   clinically stable. Patient is on optimal medical regimen ( see medication list above )  -  Patient is currently  asymptomatic from CAD.             - changes in  treatment:   no           - Testing ordered:  no  [place] classification: 1  [place] RISK SCORE:  [place] RISK SCORE:  HTN:well controlled on current medical regimen, see list above.            - changes in  treatment:   no   CARDIOMYOPATHY:  known   CONGESTIVE HEART FAILURE: compensated     VHD: S/P Mitral valve repair  DYSLIPIDEMIA: Patient's profile is at / near [facility] is low                                Tolerating current medical regimen wellyes,                                  See most recent Lab values in Labs section above. 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   ARRHYTHMIAS: H/OTachycardia. TESTS ORDERED: None this visit   PREVIOUSLY ORDERED TESTS REVIEWED & DISCUSSED WITH THE PATIENT:     I personally reviewed & interpreted, all previously ordered tests as copied above. Latest Labs are pulled in to the note with dates. Labs, specially in Reference to Lipid profile, Cardiac testing in the form of Echo, stress tests & other relevant cardiac testing reviewed with patient & recommendations made based on assessment of the results. MEDICATIONS: List of medications patient is currently taking is reviewed in detail with the patient & family member present. Discussed any side effects or problems taking the medication. Recommend Continue present management & medications as listed. AFFIRMATION: I spent at least 20 minutes of time reviewing patient's history, previous & current medical problems & all Labs + testing. This includes chart prep even prior to the vosit. Various goals are discussed and multiple questions answered. Relevant concelling performed. Office follow up in six months.

## 2021-11-09 NOTE — LETTER
[address]  [address] [address]  Phone: [phone]  Fax: [phone]    Dionna Gandara MD    November 9, 2021     Renuka Rothman, [address] [place] [address]    Patient: Dain Soto   MR Number: [id number]   YOB: 1935   Date of Visit: 11/9/2021       Dear Renuka Rothman:    Thank you for referring Familia Slade to me for evaluation/treatment. Below are the relevant portions of my assessment and plan of care. If you have questions, please do not hesitate to call me. I look forward to following Lalit Zavala along with you.     Sincerely,      Dionna Gandara MD

## 2021-11-09 NOTE — LETTER
2021 @[facility]  Patient Name: Halle Webster  : 1935  MRN# [id number]    REASON FOR VISIT: 6 month   Hypertension  CAD (coronary artery disease)  VHD (valvular heart disease)  Mitral regurgitation  Tricuspid regurgitation  Hyperlipidemia  Pulmonary HTN (HCC)  Chronic venous insufficiency    Current Outpatient Medications   Medication Sig Dispense Refill    nebivolol (BYSTOLIC) 20 MG TABS tablet Take 1 tablet by mouth 2 times daily 180 tablet 3    digoxin (LANOXIN) 125 MCG tablet Take 1 tablet by mouth daily 90 tablet 3    omega-3 acid ethyl esters (LOVAZA) 1 g capsule Take 1 capsule by mouth 2 times daily 180 capsule 3    nitroGLYCERIN (NITROSTAT) 0.4 MG SL tablet Place 1 tablet under the tongue every 5 minutes as needed for Chest pain 25 tablet 3    Icosapent Ethyl (VASCEPA) 1 g CAPS capsule Take 2 capsules by mouth 2 times daily 120 capsule 3    sertraline (ZOLOFT) 50 MG tablet TAKE 1 TABLET BY MOUTH EVERY DAY      cyclobenzaprine (FLEXERIL) 10 MG tablet Take 10 mg by mouth 2 times daily      gabapentin (NEURONTIN) 100 MG capsule Take 100 mg by mouth 3 times daily      amLODIPine (NORVASC) 2.5 MG tablet Take 1 tablet by mouth daily 30 tablet 3    valsartan-hydrochlorothiazide (DIOVAN-HCT) 320-25 MG per tablet Take 1 tablet by mouth daily       HYDROcodone-acetaminophen (VICODIN) 5-500 MG per tablet Take 1 tablet by mouth every 6 hours as needed       Hypromellose (GENTEAL OP) Apply 1-2 drops to eye nightly.  furosemide (LASIX) 20 MG tablet Take 20 mg by mouth daily       estrogens, conjugated, (PREMARIN) 0.9 MG tablet Take 0.9 mg by mouth daily.  multivitamin (THERAGRAN) per tablet Take 1 tablet by mouth daily.  esomeprazole Magnesium (NEXIUM) 40 MG PACK Take 40 mg by mouth daily.  dicyclomine (BENTYL) 20 MG tablet Take 20 mg by mouth nightly.  aspirin 81 MG tablet Take 81 mg by mouth daily.         azelastine (OPTIVAR) 0.05 % ophthalmic solution 1 drop 2 times daily. No current facility-administered medications for this visit. Smoke: What:                           How much:    Alcohol: How Much:     Caffeine: Pop:         Tea:            Coffee:                Chocolate:    Exercise:    Labs: Lipids:             CBC:       BMP/CMP:          TSH:              A1C:    Last Visit:  Complaints:  Changes:    Last EKG:10/2021    VL DUP LOWER EXTREMITY VENOUS BILATERAL:4/2021  No evidence of DVT or SVT in the bilateral common femoral vein, femoral    vein, popliteal vein, greater saphenous vein or small saphenous vein.    Significant reflux noted of the Right SSV prox (4.4s) and mid (1.9s).  Right SSV between mid and distal has calcifications in the lumen which could    hinder passage.    No significant reflux noted in the veins of the left lower extremity.    Left GSV removed previous just after SFJ to knee             STRESS TEST:  4/2021  Normal study.    Normal perfusion study with normal distribution in all coronal, short, and    horizontal axis.    Normal LV function. LVEF is > 70 %. ECHO: 6/2018   LV function and size are normal, Ejection Fraction 50-55 %. Normal left ventricular wall thickness. No regional wall motion abnormalities were detected. Normal diastolic filling pattern for age. Mildly enlarged right ventricle cavity. Mitral valve repair noted. Moderate-to-severe tricuspid regurgitation. Right ventricular systolic pressure of 75 mm Hg consistent with moderate to   severe pulmonary hypertension. No evidence of pericardial effusion.        CAROTID: NONE    MUGA: NONE    LAST PACER CHECK: NONE    CARDIAC CATH: NONE    Amio Protocol:    CHADS: [id number] Score for Atrial Fibrillation Stroke Risk   Risk   Factors  Component Value   C CHF No 0   H HTN Yes 1   A2 Age >= 76 Yes,  (80 y.o.) 2   D DM No 0   S2 Prior Stroke/TIA No 0   V Vascular Disease No 0   A Age 74-69 No,  (80 y.o.) 0   Sc Sex female 1    [id number]  Score  4   Score last updated 11/9/21 1:77 AM EST    Click here for a link to the UpToDate guideline \"Atrial Fibrillation: Anticoagulation therapy to prevent embolization    Disclaimer: Risk Score calculation is dependent on accuracy of patient problem list and past encounter diagnosis.

## 2022-04-19 RX ORDER — DIGOXIN 125 MCG
TABLET ORAL
Qty: 90 TABLET | Refills: 3 | Status: SHIPPED | OUTPATIENT
Start: 2022-04-19

## 2022-04-29 RX ORDER — NEBIVOLOL 20 MG/1
20 TABLET ORAL DAILY
Qty: 180 TABLET | Refills: 3 | Status: SHIPPED | OUTPATIENT
Start: 2022-04-29

## 2022-08-02 ENCOUNTER — HOSPITAL ENCOUNTER (OUTPATIENT)
Age: 87
Setting detail: SPECIMEN
Discharge: HOME | End: 2022-08-02
Payer: MEDICARE

## 2022-08-02 PROCEDURE — 87076 CULTURE ANAEROBE IDENT EACH: CPT

## 2022-08-02 PROCEDURE — 87075 CULTR BACTERIA EXCEPT BLOOD: CPT

## 2022-08-02 PROCEDURE — 87070 CULTURE OTHR SPECIMN AEROBIC: CPT

## 2022-08-02 PROCEDURE — 87077 CULTURE AEROBIC IDENTIFY: CPT

## 2022-08-02 PROCEDURE — 87186 SC STD MICRODIL/AGAR DIL: CPT

## 2022-08-03 PROBLEM — M86.9 OSTEOMYELITIS OF ANKLE AND FOOT (HCC): Status: ACTIVE | Noted: 2022-08-03

## 2022-08-08 LAB
CULTURE: ABNORMAL
Lab: ABNORMAL
SPECIMEN: ABNORMAL

## 2022-08-17 PROBLEM — L98.494: Status: ACTIVE | Noted: 2022-08-17

## 2022-08-17 NOTE — PROGRESS NOTES
.Surgery @ [facility] on 8/22/22 you will be called 8/19/22 with times               1. Do not eat or drink anything after midnight - unless instructed by your doctor prior to surgery. This includes                   no water, chewing gum or mints. 2. Follow your directions as prescribed by the doctor for your procedure and medications. 3. Check with your Doctor regarding stopping vitamins, supplements, blood thinners (Plavix, Coumadin, Lovenox, Effient, Pradaxa, Xarelto, Fragmin or                   other blood thinners) and follow their instructions. Stop vitamins, supplements and NSAIDS:    4. Do not smoke, vape or use chewing tobacco morning of surgery. Do not drink any alcoholic beverages 24 hours prior to surgery. This includes NA Beer. No street drugs 7 days prior to surgery. 5. You may brush your teeth and gargle the morning of surgery. DO NOT SWALLOW WATER   6. You MUST make arrangements for a responsible adult to take you home after your surgery and be able to check on you every couple                   hours for the day. You will not be allowed to leave alone or drive yourself home. It is strongly suggested someone stay with you the first 24                   hrs. Your surgery will be cancelled if you do not have a ride home. 7. Please wear simple, loose fitting clothing to the hospital.  Peg Hua not bring valuables (money, credit cards, checkbooks, etc.) Do not wear any                   makeup (including no eye makeup) or nail polish on your fingers or toes. 8. DO NOT wear any jewelry or piercings on day of surgery. All body piercing jewelry must be removed. 9. If you have dentures, they will be removed before going to the OR; we will provide you a container. If you wear contact lenses or glasses,                  they will be removed; please bring a case for them.            10. If you  have a Living Will and Durable Power of  for Healthcare, please bring in a copy. 11. Please bring picture ID,  insurance card, paperwork from the doctors office    (H & P, Consent, & card for implantable devices). 12. Take a shower the morning of your procedure with Hibiclens or an anti-bacterial soap. Do not apply any make-up, deodorant, lotion, oil or powder. 13.  Enter thru the main entrance wearing a mask on the day of surgery.

## 2022-08-19 ENCOUNTER — ANESTHESIA EVENT (OUTPATIENT)
Dept: OPERATING ROOM | Age: 87
DRG: 475 | End: 2022-08-19
Payer: MEDICARE

## 2022-08-19 ASSESSMENT — ENCOUNTER SYMPTOMS: SHORTNESS OF BREATH: 1

## 2022-08-19 NOTE — PROGRESS NOTES
This case was discussed with Dr. Matilde Helm on 8/18/22. Per Dr. Matilde Helm no additional pre-op testing is needed for this patient.

## 2022-08-19 NOTE — ANESTHESIA PRE PROCEDURE
Department of Anesthesiology  Preprocedure Note       Name:  Bernarda Rudolph   Age:  80 y.o.  :  1935                                          MRN:  [id number]         Date:  2022      Surgeon: Gracie Roca):  Dahlia Saldivar MD    Procedure: Procedure(s):  LEFT LEG AMPUTATION ABOVE KNEE    Medications prior to admission:   Prior to Admission medications    Medication Sig Start Date End Date Taking? Authorizing Provider   nebivolol (BYSTOLIC) 20 MG TABS tablet Take 1 tablet by mouth daily 22   Hansa Ahuja MD   digoxin (LANOXIN) 125 MCG tablet TAKE 1 TABLET DAILY 22   Hansa Ahuja MD   omega-3 acid ethyl esters (LOVAZA) 1 g capsule Take 1 capsule by mouth 2 times daily 21   Hansa Ahuja MD   nitroGLYCERIN (NITROSTAT) 0.4 MG SL tablet Place 1 tablet under the tongue every 5 minutes as needed for Chest pain 3/29/21   Hansa Ahuja MD   Icosapent Ethyl (VASCEPA) 1 g CAPS capsule Take 2 capsules by mouth 2 times daily 11/10/20   Hansa Ahuja MD   sertraline (ZOLOFT) 50 MG tablet TAKE 1 TABLET BY MOUTH EVERY DAY 20   Historical Provider, MD   gabapentin (NEURONTIN) 100 MG capsule Take 100 mg by mouth 3 times daily    Historical Provider, MD   amLODIPine (NORVASC) 2.5 MG tablet Take 1 tablet by mouth daily 16   Hansa Ahuja MD   valsartan-hydrochlorothiazide (DIOVAN-HCT) 320-25 MG per tablet Take 1 tablet by mouth daily     Historical Provider, MD   HYDROcodone-acetaminophen (VICODIN) 5-500 MG per tablet Take 1 tablet by mouth every 6 hours as needed     Historical Provider, MD   furosemide (LASIX) 20 MG tablet Take 20 mg by mouth daily     Historical Provider, MD   esomeprazole Magnesium (NEXIUM) 40 MG PACK Take 40 mg by mouth daily. Historical Provider, MD   dicyclomine (BENTYL) 20 MG tablet Take 20 mg by mouth nightly. Historical Provider, MD   aspirin 81 MG tablet Take 81 mg by mouth daily.       Historical Provider, MD   azelastine (OPTIVAR) 0.05 % ophthalmic solution 1 drop 2 times daily. Historical Provider, MD       Current medications:    No current facility-administered medications for this encounter. Current Outpatient Medications   Medication Sig Dispense Refill    nebivolol (BYSTOLIC) 20 MG TABS tablet Take 1 tablet by mouth daily 180 tablet 3    digoxin (LANOXIN) 125 MCG tablet TAKE 1 TABLET DAILY 90 tablet 3    omega-3 acid ethyl esters (LOVAZA) 1 g capsule Take 1 capsule by mouth 2 times daily 180 capsule 3    nitroGLYCERIN (NITROSTAT) 0.4 MG SL tablet Place 1 tablet under the tongue every 5 minutes as needed for Chest pain 25 tablet 3    Icosapent Ethyl (VASCEPA) 1 g CAPS capsule Take 2 capsules by mouth 2 times daily 120 capsule 3    sertraline (ZOLOFT) 50 MG tablet TAKE 1 TABLET BY MOUTH EVERY DAY      gabapentin (NEURONTIN) 100 MG capsule Take 100 mg by mouth 3 times daily      amLODIPine (NORVASC) 2.5 MG tablet Take 1 tablet by mouth daily 30 tablet 3    valsartan-hydrochlorothiazide (DIOVAN-HCT) 320-25 MG per tablet Take 1 tablet by mouth daily       HYDROcodone-acetaminophen (VICODIN) 5-500 MG per tablet Take 1 tablet by mouth every 6 hours as needed       furosemide (LASIX) 20 MG tablet Take 20 mg by mouth daily       esomeprazole Magnesium (NEXIUM) 40 MG PACK Take 40 mg by mouth daily.  dicyclomine (BENTYL) 20 MG tablet Take 20 mg by mouth nightly.  aspirin 81 MG tablet Take 81 mg by mouth daily.  azelastine (OPTIVAR) 0.05 % ophthalmic solution 1 drop 2 times daily. Allergies:     Allergies   Allergen Reactions    Niaspan [Niacin Er] Other (See Comments)     FLUSHING      Clopidogrel Rash    Hyzaar [Losartan Potassium-Hctz] Rash       Problem List:    Patient Active Problem List   Diagnosis Code    Hypertension I10    CAD (coronary artery disease) I25.10    S/P CABG x 1 Z95.1    VHD (valvular heart disease) I38    Mitral regurgitation I34.0    Tricuspid regurgitation I07.1    H/O normal, Ejection Fraction 50-55 %. Normal left ventricular wall thickness. No regional wall motion abnormalities were detected. Normal diastolic filling pattern for age. 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 CABG X 1 W/SVG TO RCA.  FOOT SURGERY  10-    LEFT    HYSTERECTOMY (CERVIX STATUS UNKNOWN)  1990    JOINT REPLACEMENT  1989 AND 2001    BILATERAL HIPS       Social History:    Social History     Tobacco Use    Smoking status: Never    Smokeless tobacco: Never    Tobacco comments:     Reviewed 11/1/16   Substance Use Topics    Alcohol use: No     Alcohol/week: 0.0 standard drinks                                Counseling given: Not Answered  Tobacco comments: Reviewed 11/1/16      Vital Signs (Current):   Vitals:    08/17/22 1535   Height: 5' (1.524 m)                                              BP Readings from Last 3 Encounters:   11/09/21 136/70   10/16/21 (!) 156/45   05/24/21 122/70       NPO Status:                                                                                 BMI:   Wt Readings from Last 3 Encounters:   08/02/22 83 lb (37.6 kg)   11/09/21 83 lb 9.6 oz (37.9 kg)   10/16/21 120 lb (54.4 kg)     Body mass index is 16.21 kg/m².     CBC:   Lab Results   Component Value Date/Time    WBC 6.4 10/16/2017 12:00 AM    RBC 4.35 10/16/2017 12:00 AM    HGB 14.2 10/16/2017 12:00 AM    HCT 42.1 10/16/2017 12:00 AM    MCV 96.8 10/16/2017 12:00 AM     10/16/2017 12:00 AM       CMP:   Lab Results   Component Value Date/Time     10/16/2017 12:00 AM    K 4.5 10/16/2017 12:00 AM    CL 97 10/16/2017 12:00 AM    CO2 28 10/16/2017 12:00 AM    BUN 64 10/16/2017 12:00 AM    CREATININE 1.2 10/16/2017 12:00 AM    LABGLOM 42 10/18/2016 12:00 AM    GLUCOSE 124 10/16/2017 12:00 AM    PROT 7.0 06/14/2018 01:30 PM    CALCIUM 10.5 10/16/2017 12:00 AM    BILITOT 0.4 06/14/2018 01:30 PM    ALKPHOS 35 06/14/2018 01:30 PM    AST 20 06/14/2018 01:30 PM    ALT 13 06/14/2018 01:30 PM       POC Tests: No results for input(s): POCGLU, POCNA, POCK, POCCL, POCBUN, POCHEMO, POCHCT in the last 72 hours. Coags: No results found for: PROTIME, INR, APTT    HCG (If Applicable): No results found for: PREGTESTUR, PREGSERUM, HCG, HCGQUANT     ABGs: No results found for: PHART, PO2ART, XWE1PXW, KMH7OAF, BEART, H3SHSFQS     Type & Screen (If Applicable):  No results found for: LABABO, LABRH    Drug/Infectious Status (If Applicable):  No results found for: HIV, HEPCAB    COVID-19 Screening (If Applicable): No results found for: COVID19        Anesthesia Evaluation  Patient summary reviewed  Airway:           Dental:          Pulmonary:   (+) shortness of breath:                             Cardiovascular:    (+) hypertension:, valvular problems/murmurs: MR, CAD:, CABG/stent:, pulmonary hypertension: mild,       ECG reviewed      Echocardiogram reviewed               ROS comment: Normal sinus rhythm   ST & T wave abnormality, consider anterior ischemia   Abnormal ECG   No previous ECGs available   Confirmed by Gregory Gerber MD, Marilee Frye ([id number]) on 10/17/2021 8:15:07 PM     Summary   Normal study.   Normal perfusion study with normal distribution in all coronal, short, and   horizontal axis.   Normal LV function.  LVEF is > 70 %.   Supervising physician Dr. Heber Mahmood .      Recommendation   Recommendation: Routine follow-up.      Signatures      ------------------------------------------------------------------   Electronically signed by Patsy Deluna MD (Interpreting   cardiologist) on 04/14/2021 at

## 2022-08-19 NOTE — PROGRESS NOTES
Contacted patient with surgery time 08/22/2022 at 1330 arrival time at 1130 understanding verbalized

## 2022-08-22 ENCOUNTER — APPOINTMENT (OUTPATIENT)
Dept: GENERAL RADIOLOGY | Age: 87
DRG: 475 | End: 2022-08-22
Attending: SURGERY
Payer: MEDICARE

## 2022-08-22 ENCOUNTER — HOSPITAL ENCOUNTER (INPATIENT)
Age: 87
LOS: 3 days | Discharge: HOME | DRG: 475 | End: 2022-08-25
Attending: SURGERY | Admitting: SURGERY
Payer: MEDICARE

## 2022-08-22 ENCOUNTER — ANESTHESIA (OUTPATIENT)
Dept: OPERATING ROOM | Age: 87
DRG: 475 | End: 2022-08-22
Payer: MEDICARE

## 2022-08-22 DIAGNOSIS — I70.462: ICD-10-CM

## 2022-08-22 DIAGNOSIS — L98.494: ICD-10-CM

## 2022-08-22 DIAGNOSIS — Z01.818 PRE-OP TESTING: Primary | ICD-10-CM

## 2022-08-22 PROCEDURE — 2580000003 HC RX 258: Performed by: SURGERY

## 2022-08-22 PROCEDURE — 88307 TISSUE EXAM BY PATHOLOGIST: CPT

## 2022-08-22 PROCEDURE — 0Y6D0Z3 DETACHMENT AT LEFT UPPER LEG, LOW, OPEN APPROACH: ICD-10-PCS | Performed by: SURGERY

## 2022-08-22 PROCEDURE — 3600000013 HC SURGERY LEVEL 3 ADDTL 15MIN: Performed by: SURGERY

## 2022-08-22 PROCEDURE — C1894 INTRO/SHEATH, NON-LASER: HCPCS | Performed by: SURGERY

## 2022-08-22 PROCEDURE — 6370000000 HC RX 637 (ALT 250 FOR IP): Performed by: SURGERY

## 2022-08-22 PROCEDURE — 73552 X-RAY EXAM OF FEMUR 2/>: CPT

## 2022-08-22 PROCEDURE — 6360000002 HC RX W HCPCS

## 2022-08-22 PROCEDURE — 6370000000 HC RX 637 (ALT 250 FOR IP): Performed by: ANESTHESIOLOGY

## 2022-08-22 PROCEDURE — 6360000002 HC RX W HCPCS: Performed by: SURGERY

## 2022-08-22 PROCEDURE — 7100000001 HC PACU RECOVERY - ADDTL 15 MIN: Performed by: SURGERY

## 2022-08-22 PROCEDURE — 7100000000 HC PACU RECOVERY - FIRST 15 MIN: Performed by: SURGERY

## 2022-08-22 PROCEDURE — 2709999900 HC NON-CHARGEABLE SUPPLY: Performed by: SURGERY

## 2022-08-22 PROCEDURE — 88311 DECALCIFY TISSUE: CPT

## 2022-08-22 PROCEDURE — 3700000000 HC ANESTHESIA ATTENDED CARE: Performed by: SURGERY

## 2022-08-22 PROCEDURE — 3700000001 HC ADD 15 MINUTES (ANESTHESIA): Performed by: SURGERY

## 2022-08-22 PROCEDURE — 1200000000 HC SEMI PRIVATE

## 2022-08-22 PROCEDURE — 2500000003 HC RX 250 WO HCPCS: Performed by: SURGERY

## 2022-08-22 PROCEDURE — 3600000003 HC SURGERY LEVEL 3 BASE: Performed by: SURGERY

## 2022-08-22 PROCEDURE — 2580000003 HC RX 258: Performed by: ANESTHESIOLOGY

## 2022-08-22 RX ORDER — HYDRALAZINE HYDROCHLORIDE 20 MG/ML
10 INJECTION INTRAMUSCULAR; INTRAVENOUS
Status: DISCONTINUED | OUTPATIENT
Start: 2022-08-22 | End: 2022-08-22 | Stop reason: HOSPADM

## 2022-08-22 RX ORDER — OXYCODONE HYDROCHLORIDE 5 MG/1
10 TABLET ORAL PRN
Status: DISCONTINUED | OUTPATIENT
Start: 2022-08-22 | End: 2022-08-22 | Stop reason: HOSPADM

## 2022-08-22 RX ORDER — SODIUM CHLORIDE, SODIUM LACTATE, POTASSIUM CHLORIDE, CALCIUM CHLORIDE 600; 310; 30; 20 MG/100ML; MG/100ML; MG/100ML; MG/100ML
INJECTION, SOLUTION INTRAVENOUS CONTINUOUS
Status: DISCONTINUED | OUTPATIENT
Start: 2022-08-22 | End: 2022-08-22 | Stop reason: HOSPADM

## 2022-08-22 RX ORDER — FENTANYL CITRATE 50 UG/ML
25 INJECTION, SOLUTION INTRAMUSCULAR; INTRAVENOUS EVERY 5 MIN PRN
Status: DISCONTINUED | OUTPATIENT
Start: 2022-08-22 | End: 2022-08-22 | Stop reason: HOSPADM

## 2022-08-22 RX ORDER — OXYCODONE HYDROCHLORIDE AND ACETAMINOPHEN 5; 325 MG/1; MG/1
1 TABLET ORAL EVERY 4 HOURS PRN
Status: DISCONTINUED | OUTPATIENT
Start: 2022-08-22 | End: 2022-08-25 | Stop reason: HOSPADM

## 2022-08-22 RX ORDER — VALSARTAN AND HYDROCHLOROTHIAZIDE 320; 12.5 MG/1; MG/1
1 TABLET, FILM COATED ORAL ONCE
Status: DISCONTINUED | OUTPATIENT
Start: 2022-08-23 | End: 2022-08-22

## 2022-08-22 RX ORDER — MORPHINE SULFATE 4 MG/ML
4 INJECTION, SOLUTION INTRAMUSCULAR; INTRAVENOUS
Status: DISCONTINUED | OUTPATIENT
Start: 2022-08-22 | End: 2022-08-25 | Stop reason: HOSPADM

## 2022-08-22 RX ORDER — HYDROCHLOROTHIAZIDE 12.5 MG/1
12.5 TABLET ORAL ONCE
Status: DISCONTINUED | OUTPATIENT
Start: 2022-08-23 | End: 2022-08-22

## 2022-08-22 RX ORDER — OXYCODONE HYDROCHLORIDE 5 MG/1
5 TABLET ORAL PRN
Status: DISCONTINUED | OUTPATIENT
Start: 2022-08-22 | End: 2022-08-22 | Stop reason: HOSPADM

## 2022-08-22 RX ORDER — VALSARTAN AND HYDROCHLOROTHIAZIDE 320; 12.5 MG/1; MG/1
1 TABLET, FILM COATED ORAL ONCE
Status: COMPLETED | OUTPATIENT
Start: 2022-08-22 | End: 2022-08-22

## 2022-08-22 RX ORDER — IPRATROPIUM BROMIDE AND ALBUTEROL SULFATE 2.5; .5 MG/3ML; MG/3ML
1 SOLUTION RESPIRATORY (INHALATION)
Status: DISCONTINUED | OUTPATIENT
Start: 2022-08-22 | End: 2022-08-22 | Stop reason: HOSPADM

## 2022-08-22 RX ORDER — BUPIVACAINE HYDROCHLORIDE AND EPINEPHRINE 2.5; 5 MG/ML; UG/ML
INJECTION, SOLUTION EPIDURAL; INFILTRATION; INTRACAUDAL; PERINEURAL
Status: COMPLETED | OUTPATIENT
Start: 2022-08-22 | End: 2022-08-22

## 2022-08-22 RX ORDER — SODIUM CHLORIDE 9 MG/ML
25 INJECTION, SOLUTION INTRAVENOUS PRN
Status: DISCONTINUED | OUTPATIENT
Start: 2022-08-22 | End: 2022-08-22 | Stop reason: HOSPADM

## 2022-08-22 RX ORDER — FENTANYL CITRATE 50 UG/ML
INJECTION, SOLUTION INTRAMUSCULAR; INTRAVENOUS PRN
Status: DISCONTINUED | OUTPATIENT
Start: 2022-08-22 | End: 2022-08-22 | Stop reason: SDUPTHER

## 2022-08-22 RX ORDER — DEXAMETHASONE SODIUM PHOSPHATE 4 MG/ML
INJECTION, SOLUTION INTRA-ARTICULAR; INTRALESIONAL; INTRAMUSCULAR; INTRAVENOUS; SOFT TISSUE PRN
Status: DISCONTINUED | OUTPATIENT
Start: 2022-08-22 | End: 2022-08-22 | Stop reason: SDUPTHER

## 2022-08-22 RX ORDER — SODIUM CHLORIDE 0.9 % (FLUSH) 0.9 %
5-40 SYRINGE (ML) INJECTION EVERY 12 HOURS SCHEDULED
Status: DISCONTINUED | OUTPATIENT
Start: 2022-08-22 | End: 2022-08-22 | Stop reason: HOSPADM

## 2022-08-22 RX ORDER — SODIUM CHLORIDE, SODIUM LACTATE, POTASSIUM CHLORIDE, CALCIUM CHLORIDE 600; 310; 30; 20 MG/100ML; MG/100ML; MG/100ML; MG/100ML
INJECTION, SOLUTION INTRAVENOUS CONTINUOUS
Status: DISCONTINUED | OUTPATIENT
Start: 2022-08-22 | End: 2022-08-24

## 2022-08-22 RX ORDER — ONDANSETRON 2 MG/ML
4 INJECTION INTRAMUSCULAR; INTRAVENOUS EVERY 6 HOURS PRN
Status: DISCONTINUED | OUTPATIENT
Start: 2022-08-22 | End: 2022-08-25 | Stop reason: HOSPADM

## 2022-08-22 RX ORDER — VALSARTAN 160 MG/1
320 TABLET ORAL ONCE
Status: DISCONTINUED | OUTPATIENT
Start: 2022-08-23 | End: 2022-08-22

## 2022-08-22 RX ORDER — ONDANSETRON 2 MG/ML
INJECTION INTRAMUSCULAR; INTRAVENOUS PRN
Status: DISCONTINUED | OUTPATIENT
Start: 2022-08-22 | End: 2022-08-22 | Stop reason: SDUPTHER

## 2022-08-22 RX ORDER — DROPERIDOL 2.5 MG/ML
0.62 INJECTION, SOLUTION INTRAMUSCULAR; INTRAVENOUS
Status: DISCONTINUED | OUTPATIENT
Start: 2022-08-22 | End: 2022-08-22 | Stop reason: HOSPADM

## 2022-08-22 RX ORDER — HYDROCHLOROTHIAZIDE 12.5 MG/1
12.5 TABLET ORAL ONCE
Status: DISCONTINUED | OUTPATIENT
Start: 2022-08-22 | End: 2022-08-22

## 2022-08-22 RX ORDER — SODIUM CHLORIDE 0.9 % (FLUSH) 0.9 %
5-40 SYRINGE (ML) INJECTION PRN
Status: DISCONTINUED | OUTPATIENT
Start: 2022-08-22 | End: 2022-08-22 | Stop reason: HOSPADM

## 2022-08-22 RX ORDER — VALSARTAN 160 MG/1
320 TABLET ORAL ONCE
Status: DISCONTINUED | OUTPATIENT
Start: 2022-08-22 | End: 2022-08-22

## 2022-08-22 RX ORDER — PROPOFOL 10 MG/ML
INJECTION, EMULSION INTRAVENOUS PRN
Status: DISCONTINUED | OUTPATIENT
Start: 2022-08-22 | End: 2022-08-22 | Stop reason: SDUPTHER

## 2022-08-22 RX ORDER — ONDANSETRON 2 MG/ML
4 INJECTION INTRAMUSCULAR; INTRAVENOUS
Status: DISCONTINUED | OUTPATIENT
Start: 2022-08-22 | End: 2022-08-22 | Stop reason: HOSPADM

## 2022-08-22 RX ORDER — HYDROCHLOROTHIAZIDE 12.5 MG/1
12.5 TABLET ORAL ONCE
Status: COMPLETED | OUTPATIENT
Start: 2022-08-23 | End: 2022-08-23

## 2022-08-22 RX ORDER — LABETALOL HYDROCHLORIDE 5 MG/ML
10 INJECTION, SOLUTION INTRAVENOUS
Status: DISCONTINUED | OUTPATIENT
Start: 2022-08-22 | End: 2022-08-22 | Stop reason: HOSPADM

## 2022-08-22 RX ORDER — VALSARTAN 160 MG/1
320 TABLET ORAL ONCE
Status: COMPLETED | OUTPATIENT
Start: 2022-08-23 | End: 2022-08-23

## 2022-08-22 RX ADMIN — ONDANSETRON 4 MG: 2 INJECTION INTRAMUSCULAR; INTRAVENOUS at 14:49

## 2022-08-22 RX ADMIN — FENTANYL CITRATE 25 MCG: 50 INJECTION, SOLUTION INTRAMUSCULAR; INTRAVENOUS at 14:35

## 2022-08-22 RX ADMIN — VALSARTAN AND HYDROCHLOROTHIAZIDE 1 TABLET: 320; 12.5 TABLET, FILM COATED ORAL at 13:17

## 2022-08-22 RX ADMIN — PROPOFOL 30 MG: 10 INJECTION, EMULSION INTRAVENOUS at 14:15

## 2022-08-22 RX ADMIN — FENTANYL CITRATE 25 MCG: 50 INJECTION, SOLUTION INTRAMUSCULAR; INTRAVENOUS at 14:04

## 2022-08-22 RX ADMIN — PROPOFOL 70 MG: 10 INJECTION, EMULSION INTRAVENOUS at 13:49

## 2022-08-22 RX ADMIN — FENTANYL CITRATE 25 MCG: 50 INJECTION, SOLUTION INTRAMUSCULAR; INTRAVENOUS at 14:15

## 2022-08-22 RX ADMIN — OXYCODONE HYDROCHLORIDE AND ACETAMINOPHEN 1 TABLET: 5; 325 TABLET ORAL at 23:22

## 2022-08-22 RX ADMIN — DEXAMETHASONE SODIUM PHOSPHATE 4 MG: 4 INJECTION, SOLUTION INTRAMUSCULAR; INTRAVENOUS at 13:58

## 2022-08-22 RX ADMIN — VANCOMYCIN HYDROCHLORIDE 500 MG: 500 INJECTION, POWDER, LYOPHILIZED, FOR SOLUTION INTRAVENOUS at 13:52

## 2022-08-22 RX ADMIN — OXYCODONE HYDROCHLORIDE AND ACETAMINOPHEN 1 TABLET: 5; 325 TABLET ORAL at 17:01

## 2022-08-22 RX ADMIN — FENTANYL CITRATE 25 MCG: 50 INJECTION, SOLUTION INTRAMUSCULAR; INTRAVENOUS at 13:55

## 2022-08-22 RX ADMIN — SODIUM CHLORIDE, POTASSIUM CHLORIDE, SODIUM LACTATE AND CALCIUM CHLORIDE: 600; 310; 30; 20 INJECTION, SOLUTION INTRAVENOUS at 16:51

## 2022-08-22 RX ADMIN — SODIUM CHLORIDE, POTASSIUM CHLORIDE, SODIUM LACTATE AND CALCIUM CHLORIDE: 600; 310; 30; 20 INJECTION, SOLUTION INTRAVENOUS at 12:30

## 2022-08-22 RX ADMIN — MORPHINE SULFATE 4 MG: 4 INJECTION, SOLUTION INTRAMUSCULAR; INTRAVENOUS at 19:57

## 2022-08-22 ASSESSMENT — PAIN DESCRIPTION - PAIN TYPE
TYPE: ACUTE PAIN;SURGICAL PAIN
TYPE: SURGICAL PAIN
TYPE: SURGICAL PAIN;CHRONIC PAIN

## 2022-08-22 ASSESSMENT — PAIN DESCRIPTION - ONSET
ONSET: ON-GOING
ONSET: ON-GOING

## 2022-08-22 ASSESSMENT — PAIN DESCRIPTION - FREQUENCY: FREQUENCY: CONTINUOUS

## 2022-08-22 ASSESSMENT — PAIN - FUNCTIONAL ASSESSMENT
PAIN_FUNCTIONAL_ASSESSMENT: 0-10
PAIN_FUNCTIONAL_ASSESSMENT: PREVENTS OR INTERFERES SOME ACTIVE ACTIVITIES AND ADLS

## 2022-08-22 ASSESSMENT — PAIN DESCRIPTION - DESCRIPTORS
DESCRIPTORS: ACHING
DESCRIPTORS: ACHING;STABBING
DESCRIPTORS: ACHING

## 2022-08-22 ASSESSMENT — PAIN SCALES - GENERAL
PAINLEVEL_OUTOF10: 7
PAINLEVEL_OUTOF10: 7
PAINLEVEL_OUTOF10: 5
PAINLEVEL_OUTOF10: 7
PAINLEVEL_OUTOF10: 0

## 2022-08-22 ASSESSMENT — PAIN DESCRIPTION - LOCATION
LOCATION: LEG
LOCATION: LEG;BACK
LOCATION: LEG
LOCATION: LEG

## 2022-08-22 ASSESSMENT — PAIN DESCRIPTION - ORIENTATION
ORIENTATION: LEFT

## 2022-08-22 NOTE — BRIEF OP NOTE
Brief Postoperative Note      Patient: Erna Cole  YOB: 1935  MRN: [id number]    Date of Procedure: 8/22/2022    Pre-Op Diagnosis: [facility] [L98.494]    Post-Op Diagnosis: Same       Procedure(s):  LEFT LEG AMPUTATION ABOVE KNEE    Surgeon(s):  Mary Jane Bolaños MD    Assistant:  * No surgical staff found *    Anesthesia: General    Estimated Blood Loss (mL): less than 50     Complications: None    Specimens:   ID Type Source Tests Collected by Time Destination   A : Left Leg Above the Knee Specimen Leg SURGICAL PATHOLOGY Mary Jane Bolaños MD 8/22/2022 1436        Implants:  * No implants in log *      Drains: * No LDAs found *    Findings: see note    Electronically signed by Mary Jane Bolaños MD on 8/22/2022 at 3:17 PM

## 2022-08-22 NOTE — ANESTHESIA POSTPROCEDURE EVALUATION
Department of Anesthesiology  Postprocedure Note    Patient: Alondra Chairez  MRN: [id number]  YOB: 1935  Date of evaluation: 8/22/2022      Procedure Summary     Date: 08/22/22 Room / Location: [address] [place] [address]    Anesthesia Start: 7209 Anesthesia Stop: 8648    Procedure: LEFT LEG AMPUTATION ABOVE KNEE (Left: Leg Upper) Diagnosis:       Ulcer of skin, with necrosis of bone ([facility])      ([facility] [Q11.053])    Surgeons: Flaco Pinto MD Responsible Provider: Phoebe Sam MD    Anesthesia Type: General ASA Status: 4          Anesthesia Type: General    Kay Phase I:      Kay Phase II:        Anesthesia Post Evaluation    Patient location during evaluation: PACU  Patient participation: complete - patient participated  Level of consciousness: awake  Airway patency: patent  Nausea & Vomiting: no nausea and no vomiting  Complications: no  Cardiovascular status: hemodynamically stable  Respiratory status: face mask and spontaneous ventilation  Hydration status: stable

## 2022-08-22 NOTE — CONSULTS
[address] [facility]  consulted by Dr. Mae Menjivar for monitoring and adjustment. Indication for treatment: Surgical prophylaxis    Pertinent Laboratory Values:   Temp Readings from Last 3 Encounters:   08/22/22 97.8 °F (36.6 °C) (Temporal)   08/10/22 97.6 °F (36.4 °C)   08/02/22 98.2 °F (36.8 °C)     No results for input(s): WBC, LACTATE in the last 72 hours. No results for input(s): BUN, CREATININE in the last 72 hours. CrCl cannot be calculated (Patient's most recent lab result is older than the maximum 180 days allowed. ). No intake or output data in the 24 hours ending 08/22/22 1248    Plan:  Give vancomycin 500mg (15mg/kg) ivpb x1 dose within 60 to 120 minutes prior to initial surgical incision. Thank you for the consult. Erin Padilla, [facility]  8/22/2022 12:48 PM

## 2022-08-22 NOTE — H&P
Thank you for choosing [facility].  You may be receiving a survey in the mail from Femi Gregory regarding your visit today.  Please take a few minutes to complete and return the survey to let us know how we are doing.      If you have questions or concerns, please contact us via Flyzik or you can contact your care team at [phone].    Our Clinic hours are:  Monday 6:40 am  to 7:00 pm  Tuesday -Friday 6:40 am to 5:00 pm    The [place] outpatient lab hours are:  Monday - Friday 6:10 am to 4:45 pm  Saturdays 7:00 am to 11:00 am  Appointments are required, call [phone]    If you have clinical questions after hours or would like to schedule an appointment,  call the clinic at [phone].    Norco 1-2 tablets daily as needed for severe back pain, abdominal pain    Drink more fluids         History and Physical    Patient's Name/Date of Birth: Corrine Sanabria / 1935 (78 y.o.)    Date: August 22, 2022     Chief Complaint: Gangrene in my left leg    HPI:  51-year-old female I have been seeing in the office for a few weeks who has severe ischemia of the left leg gangrenous changes and exposed bone of the heel. This patient has been in hospice care due to severe cardiac disease from quite a period of time and has remained clinically stable. 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 We have seen the patient in the office on 2 different occasions debridements of failed to heal she does have ischemia of the leg without severe pain but this is infected and has exposed bone at the calcaneus on the left. The patient was trying to avoid surgery but contacted me claiming that she wishes to proceed with amputation of the leg. The patient been nonambulatory for over a year or so left above-knee amputation is recommended for palliative treatment. The patient is a DNR CC but is now faced with an emergent situation to avoid progressive sepsis and infection of her leg. Past Medical History:   Diagnosis Date    CAD (coronary artery disease)     Cancer ([facility] [address]     UTERINE    CRI (chronic renal insufficiency) 03/2010    STAGE 3 RENAL DISEASE    DJD (degenerative joint disease)     H/O cardiomyopathy     H/O cardiovascular stress test 9-9-14,    5- 9/14 ([facility]) NORMAL STUDY. EF 70%. H/O cataract     BILATERAL    H/O echocardiogram 5-6-13 08- 5/13EF=>55%. MOD TR and mild MR.     8/10 BORDERLINE CONCENTRIC LEFT VENTRICULAR HYPERTROPHY. ABN. LEFT VENT. DIASTOLIC FUNCTION STAGE 1. MILD MITRIL ANNULAR CALCIFICATION. MILD TRICUSPID REGURG. NOTED. H/O echocardiogram 08/12/2015    EF >55%. Normal LV systolic function. Severe pulm HTN. Mod-severe TR.  Mild MR.     H/O hiatal hernia     H/O mitral valve repair 07/2010    H/O transesophageal echocardiography (BRITTANI) for monitoring 06/30/2010    3-4+ MITRIL REGURG. AND MILD TRICUSPID REGURG. History of IBS     History of PTCA 11/23/2009    STENT (3.0 X 12 PROMUS) RCA. MILD OSTIAL DISEASE LM. CRITICAL OSTIAL DISEASE RCA. ELEVATED SYSTEMIC PRESSURES AT REST. MILD MITRIL [address] History of stress test 04/12/2021    normal    Hx of cardiovascular stress test 08/17/2015    cardiolite-normal    Hx of Doppler echocardiogram 05/06/2013    EF55%  mild mitral regurg, moderate tricuspid regurg, right ventricular systolic pressure elevated at 60mmHg    Hx of Doppler echocardiogram 06/25/2018    LV function and size are normal, Ejection Fraction 50-55 %. Normal left ventricular wall thickness. No regional wall motion abnormalities were detected. Normal diastolic filling pattern for age. 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 CABG X 1 W/SVG TO RCA. FOOT SURGERY  10-    LEFT    HYSTERECTOMY (CERVIX STATUS UNKNOWN)  1990    JOINT REPLACEMENT  1989 AND 2001    BILATERAL HIPS       No current facility-administered medications for this encounter.      Current Outpatient Medications   Medication Sig Dispense Refill    nebivolol (BYSTOLIC) 20 MG TABS tablet Take 1 tablet by mouth daily 180 tablet 3    digoxin (LANOXIN) 125 MCG tablet TAKE 1 TABLET DAILY 90 tablet 3    omega-3 acid ethyl esters (LOVAZA) 1 g capsule Take 1 capsule by mouth 2 times daily 180 capsule 3    nitroGLYCERIN (NITROSTAT) 0.4 MG SL tablet Place 1 tablet under the tongue every 5 minutes as needed for Chest pain 25 tablet 3    Icosapent Ethyl (VASCEPA) 1 g CAPS capsule Take 2 capsules by mouth 2 times daily 120 capsule 3    sertraline (ZOLOFT) 50 MG tablet TAKE 1 TABLET BY MOUTH EVERY DAY      gabapentin (NEURONTIN) 100 MG capsule Take 100 mg by mouth 3 times daily      amLODIPine (NORVASC) 2.5 MG tablet Take 1 tablet by mouth daily 30 tablet 3    valsartan-hydrochlorothiazide (DIOVAN-HCT) 320-25 MG per tablet Take 1 tablet by mouth daily       HYDROcodone-acetaminophen (VICODIN) 5-500 MG per tablet Take 1 tablet by mouth every 6 hours as needed       furosemide (LASIX) 20 MG tablet Take 20 mg by mouth daily       esomeprazole Magnesium (NEXIUM) 40 MG PACK Take 40 mg by mouth daily. dicyclomine (BENTYL) 20 MG tablet Take 20 mg by mouth nightly. aspirin 81 MG tablet Take 81 mg by mouth daily. azelastine (OPTIVAR) 0.05 % ophthalmic solution 1 drop 2 times daily. Allergies   Allergen Reactions    Niaspan [Niacin Er] Other (See Comments)     FLUSHING      Clopidogrel Rash    Hyzaar [Losartan Potassium-Hctz] Rash       Family History   Problem Relation Age of Onset    High Blood Pressure Mother     Heart Disease Father     High Blood Pressure Father     Cancer Brother         COLON       Social History     Socioeconomic History    Marital status:       Spouse name: Not on file    Number of children: Not on file    Years of education: Not on file    Highest education level: Not on file   Occupational History    Not on file   Tobacco Use    Smoking status: Never    Smokeless tobacco: Never    Tobacco comments:     Reviewed 11/1/16   Substance and Sexual Activity    Alcohol use: No     Alcohol/week: 0.0 standard drinks    Drug use: No    Sexual activity: Yes     Partners: Male     Comment:  62 years   Other Topics Concern    Not on file   Social History Narrative    Not on file     Social Determinants of Health     Financial Resource Strain: Not on file   Food Insecurity: Not on file   Transportation Needs: Not on file   Physical Activity: Not on file   Stress: Not on file   Social Connections: Not on file   Intimate Partner Violence: Not on file   Housing Stability: Not on file       ROS: Non-contributory    Physical Exam:  There were no vitals filed for this visit. Mental Status: Alert and Oriented Thin female in no acute distress    Chest: Breath sounds were clear and equal with no rales, wheezes, or rhonchi. Respiratory effort was normal with no retractions or use of accessory muscles. Cardiovascular: Median sternotomy scar from previous CABG. Heart sounds were normal with a regular rate and rhythm. There were no murmurs or gallops. Abdomen: Bowel sounds were normal.  The abdomen was soft and non distended. There was no tenderness, guarding, rebound, or rigidity. There was no masses, hepatosplenomegaly, or hernias. Examination of the left lower extremity shows ischemia of the left foot with slight gangrenous changes of the toes. There is a necrotic 4-5 cm ulcer on the dorsal aspect of the foot and there is tissue loss of the calcaneus with exposed bone. There is no cellulitis but there is some redness of the foot. Labs   Bmp:    Lab Results   Component Value Date/Time     10/16/2017 12:00 AM    K 4.5 10/16/2017 12:00 AM    CL 97 10/16/2017 12:00 AM    CO2 28 10/16/2017 12:00 AM    BUN 64 10/16/2017 12:00 AM    CREATININE 1.2 10/16/2017 12:00 AM    CALCIUM 10.5 10/16/2017 12:00 AM     CBC:    Lab Results   Component Value Date/Time    WBC 6.4 10/16/2017 12:00 AM    RBC 4.35 10/16/2017 12:00 AM    HGB 14.2 10/16/2017 12:00 AM    HCT 42.1 10/16/2017 12:00 AM    MCV 96.8 10/16/2017 12:00 AM    MCH 32.6 10/16/2017 12:00 AM    MCHC 34.3 09/02/2014 12:00 AM     10/16/2017 12:00 AM            Assessment/Plan:  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 The patient understands the risks and benefits and the indication for surgery is given permission for operation.   She will undergo amputation of the leg today with postoperative care. Statement Of Medical Necessity:  Surgical Intervention required to alleviate patients symptoms as described above. Electronically by Frank Ceron MD  on 8/22/2022 at 8:31 AM     I have fully discussed the plan of treatment and answered all questions for the patient.

## 2022-08-22 NOTE — PROGRESS NOTES
80 Pt arrived from OR to PACU. Monitors applied and alarms set. Report received from [facility] and EJ CRNA.  3370. RA O2 Taking ice chips without difficulty. Family updated. Went to pts room.   1552 Report called to [facility].  1796 Out of PACU

## 2022-08-23 PROBLEM — I70.462: Status: ACTIVE | Noted: 2022-08-23

## 2022-08-23 PROCEDURE — 6360000002 HC RX W HCPCS: Performed by: SURGERY

## 2022-08-23 PROCEDURE — 97535 SELF CARE MNGMENT TRAINING: CPT

## 2022-08-23 PROCEDURE — 97162 PT EVAL MOD COMPLEX 30 MIN: CPT

## 2022-08-23 PROCEDURE — 97112 NEUROMUSCULAR REEDUCATION: CPT

## 2022-08-23 PROCEDURE — 1200000000 HC SEMI PRIVATE

## 2022-08-23 PROCEDURE — 6370000000 HC RX 637 (ALT 250 FOR IP): Performed by: SURGERY

## 2022-08-23 PROCEDURE — 94761 N-INVAS EAR/PLS OXIMETRY MLT: CPT

## 2022-08-23 PROCEDURE — 99211 OFF/OP EST MAY X REQ PHY/QHP: CPT

## 2022-08-23 PROCEDURE — 97166 OT EVAL MOD COMPLEX 45 MIN: CPT

## 2022-08-23 PROCEDURE — 2580000003 HC RX 258: Performed by: SURGERY

## 2022-08-23 RX ORDER — ENOXAPARIN SODIUM 100 MG/ML
40 INJECTION SUBCUTANEOUS DAILY
Status: DISCONTINUED | OUTPATIENT
Start: 2022-08-23 | End: 2022-08-24

## 2022-08-23 RX ADMIN — MORPHINE SULFATE 4 MG: 4 INJECTION, SOLUTION INTRAMUSCULAR; INTRAVENOUS at 09:59

## 2022-08-23 RX ADMIN — SODIUM CHLORIDE, POTASSIUM CHLORIDE, SODIUM LACTATE AND CALCIUM CHLORIDE: 600; 310; 30; 20 INJECTION, SOLUTION INTRAVENOUS at 09:59

## 2022-08-23 RX ADMIN — ENOXAPARIN SODIUM 40 MG: 100 INJECTION SUBCUTANEOUS at 10:21

## 2022-08-23 RX ADMIN — OXYCODONE HYDROCHLORIDE AND ACETAMINOPHEN 1 TABLET: 5; 325 TABLET ORAL at 18:03

## 2022-08-23 RX ADMIN — OXYCODONE HYDROCHLORIDE AND ACETAMINOPHEN 1 TABLET: 5; 325 TABLET ORAL at 13:25

## 2022-08-23 RX ADMIN — OXYCODONE HYDROCHLORIDE AND ACETAMINOPHEN 1 TABLET: 5; 325 TABLET ORAL at 21:58

## 2022-08-23 RX ADMIN — VALSARTAN 320 MG: 160 TABLET, FILM COATED ORAL at 08:54

## 2022-08-23 RX ADMIN — OXYCODONE HYDROCHLORIDE AND ACETAMINOPHEN 1 TABLET: 5; 325 TABLET ORAL at 08:54

## 2022-08-23 RX ADMIN — HYDROCHLOROTHIAZIDE 12.5 MG: 12.5 TABLET ORAL at 08:54

## 2022-08-23 ASSESSMENT — PAIN DESCRIPTION - LOCATION
LOCATION: LEG
LOCATION: LEG;BACK
LOCATION: LEG
LOCATION: BACK
LOCATION: LEG

## 2022-08-23 ASSESSMENT — PAIN SCALES - GENERAL
PAINLEVEL_OUTOF10: 7
PAINLEVEL_OUTOF10: 2
PAINLEVEL_OUTOF10: 6
PAINLEVEL_OUTOF10: 2
PAINLEVEL_OUTOF10: 6
PAINLEVEL_OUTOF10: 2
PAINLEVEL_OUTOF10: 2
PAINLEVEL_OUTOF10: 5
PAINLEVEL_OUTOF10: 6
PAINLEVEL_OUTOF10: 6

## 2022-08-23 ASSESSMENT — PAIN DESCRIPTION - ORIENTATION
ORIENTATION: LEFT
ORIENTATION: LEFT
ORIENTATION: LOWER
ORIENTATION: LEFT
ORIENTATION: LEFT

## 2022-08-23 ASSESSMENT — PAIN DESCRIPTION - DESCRIPTORS
DESCRIPTORS: ACHING;BURNING
DESCRIPTORS: ACHING;THROBBING;STABBING
DESCRIPTORS: ACHING
DESCRIPTORS: ACHING;SORE;THROBBING
DESCRIPTORS: SHOOTING;THROBBING

## 2022-08-23 ASSESSMENT — PAIN - FUNCTIONAL ASSESSMENT
PAIN_FUNCTIONAL_ASSESSMENT: ACTIVITIES ARE NOT PREVENTED
PAIN_FUNCTIONAL_ASSESSMENT: PREVENTS OR INTERFERES WITH MANY ACTIVE NOT PASSIVE ACTIVITIES
PAIN_FUNCTIONAL_ASSESSMENT: ACTIVITIES ARE NOT PREVENTED
PAIN_FUNCTIONAL_ASSESSMENT: PREVENTS OR INTERFERES SOME ACTIVE ACTIVITIES AND ADLS
PAIN_FUNCTIONAL_ASSESSMENT: ACTIVITIES ARE NOT PREVENTED

## 2022-08-23 ASSESSMENT — PAIN DESCRIPTION - ONSET: ONSET: ON-GOING

## 2022-08-23 ASSESSMENT — PAIN DESCRIPTION - PAIN TYPE: TYPE: SURGICAL PAIN

## 2022-08-23 ASSESSMENT — PAIN DESCRIPTION - FREQUENCY: FREQUENCY: CONTINUOUS

## 2022-08-23 NOTE — PROGRESS NOTES
Occupational Therapy    [facility] CARE OCCUPATIONAL THERAPY EVALUATION    Erna Cole, 1935, 1101/1101-A, 8/23/2022    Discharge Recommendation: Finn Anderson      History:  [place]:  The primary encounter diagnosis was Pre-op testing. Diagnoses of Ulcer of skin, with necrosis of bone ([address] and Ulcer of skin, with necrosis of bone ([address] were also pertinent to this visit. Subjective:  Patient states: \"Did I get a passing grade today? \" (Pt stated at end of session)  Pain: Pt reported 4/10 pain in residual Lt LE  Communication with other providers: PT TEOFILO Limon CM  Restrictions: General Precautions, High Fall Risk, Lt AKA, IV, Scott, Bed exit alarm    Home Setup/Prior level of function:  Social/Functional History  Lives With: Alone (has home hospice RN and aide 2x/week each, granddaughters are local and help pt with transfers)  Type of Home: House  Home Layout: One level  Home Access: Stairs to enter without rails  Entrance Stairs - Number of Steps: 4  Bathroom Shower/Tub: Tub/Shower unit  Bathroom Toilet: Handicap height  Bathroom Equipment: Tub transfer bench  Bathroom Accessibility: Accessible  Home Equipment: [address] 450 bed  ADL Assistance: Needs assistance (has aide or granddaughter assist with bathing, dressing, and courtney care. Pt wears Depends and defecates in them during the day due to being unable to transfer to toilet)   Homemaking Assistance: Needs assistance  Homemaking Responsibilities: No  Ambulation Assistance: Non-ambulatory (uses manual wheelchair; has not ambulated in 2 years)  Transfer Assistance: Needs assistance (granddaughters assist pt with stand pivot transfers to her wheelchair and tub transfer bench)  Active : No  Occupation: Retired (physician office)    Examination:  Observation: Supine in bed upon arrival. Pleasant and agreeable to evaluation.   Vision: WFL (Glasses)  Hearing: WFL  Vitals: Stable vitals throughout session    Body Systems and functions:  ROM: WFL all joints in BL UEs observed functionally  Strength: At least 3+ to 4-/5 MMT all major muscle groups BL UEs observed functionally  Sensation: WFL in BL UEs (See PT evaluation for LE assessment)  Tone: Normal  Coordination: WFL for ADLs    Activities of Daily Living (ADLs):  Feeding: Supervision/setup (in High-Berry's position)  Grooming: Min A (for thoroughness with combing back of hair, also completed facial hygiene task seated EOB using Rt hand with min A for static sitting balance)  UB bathing: Mod A   LB bathing: Max A   UB dressing: Dependent (donning clean hospital gown)  LB dressing: Dependent (donning Rt sock)  Toileting: Dependent (Scott in place; total assist required for a bowel movement which is also pt's baseline)    Cognitive and Psychosocial Functioning:  Overall cognitive status: WFL  Affect: Normal     Balance:   Sitting: Ranged from SBA to Mod A static sitting EOB x 12 minutes; intermittent heavy retropulsion, max cues for postural correction  Standing: Unsafe/unable to attempt. Pt does not statically stand at baseline    Functional Mobility:  Bed Mobility: Max A supine to sitting EOB (HOB elevated to 40', assist for scooting Rt LE to edge and max trunk boost for uprighting), Max A x 2 sitting EOB to supine and for scooting hips up in bed  Transfers: Unsafe/unable to attempt this date. Has significant assist at baseline  Ambulation: Has not ambulated in 2 years      AM-PAC 6 click short form for inpatient daily activity:   How much help from another person does the patient currently need. .. Unable  Dep A Lot  Max A A Lot   Mod A A Little  Min A A Little   CGA  SBA None   Mod I  Indep  Sup   1. Putting on and taking off regular lower body clothing? [x] 1    [] 2   [] 2   [] 3   [] 3   [] 4      2. Bathing (including washing, rinsing, drying)? [] 1   [x] 2   [] 2 [] 3 [] 3 [] 4   3.  Toileting, which includes using toilet, bedpan, or urinal? [x] 1 [] 2   [] 2   [] 3   [] 3   [] 4     4. Putting on and taking off regular upper body clothing? [x] 1   [] 2   [] 2   [] 3   [] 3    [] 4      5. Taking care of personal grooming such as brushing teeth? [] 1   [] 2    [] 2 [x] 3    [] 3   [] 4      6. Eating meals? [] 1   [] 2   [] 2   [] 3   [x] 3   [] 4      Raw Score: 11  [24=0% impaired(CH), 23=1-19%(CI), 20-22=20-39%(CJ), 15-19=40-59%(CK), 10-14=60-79%(CL), 7-9=80-99%(CM), 6=100%(CN)]     Treatment:  Self Care Training:   Cues were given for safety, sequence, UE/LE placement, visual cues, and balance. Activities performed today included UB/LB dressing tasks, facial hygiene, grooming task of combing hair    Safety Measures: Gait belt used, Left in Bed, Alarm in place    Assessment:  Pt is a 80year old female with a past medical history of CAD (coronary artery disease), Cancer ([facility] [address] CRI (chronic renal insufficiency), DJD (degenerative joint disease), H/O cardiomyopathy, H/O cardiovascular stress test, H/O cataract, H/O echocardiogram, H/O echocardiogram, H/O hiatal hernia, H/O mitral valve repair, H/O transesophageal echocardiography (BRITTANI) for monitoring, History of IBS, History of PTCA, History of stress test, Hx of cardiovascular stress test, Hx of Doppler echocardiogram, Hx of Doppler echocardiogram, Hx of glaucoma, Hyperlipidemia, Hypertension, Leg edema, Mitral regurgitation, Pulmonary HTN (HCC), S/P CABG x 1, SOB (shortness of breath), Tricuspid regurgitation, and VHD (valvular heart disease). Pt admitted with Lt LE gangrene and underwent Lt AKA on 8-22. Pt lives at home alone, has recently had home hospice services, has required significant assistance with ADLs/transfers, and has not ambulated in 2 years. Pt currently presents with the above impairments, and would benefit from a trial of skilled OT services in SNF at discharge. Complexity: Moderate  Prognosis: Fair  Plan: 3x/week      Goals:  1.  Pt will complete all aspects of bed mobility for EOB/OOB ADLs min A  2. Pt will complete UB/LB bathing mod A with setup using long handled sponge/modified techniques PRN  3. Pt will complete all aspects of LB dressing mod A with setup using AE/modified techniques PRN  4. Pt will complete all functional transfers to and from bed, chair, BSC max A on Rt LE  5. Pt will complete all aspects of toileting task on BSC max A  6. Pt will complete oral hygiene/grooming routine in unsupported sitting EOB CGA with setup  7.  Pt will complete ther ex/ther act with focus on UE strengthening and sitting balance/tolerance EOB for ADLs    Time:   Time in: 1418  Time out: 1446  Timed treatment minutes: 8  Total time: 28      Electronically signed by:    MARISSA Sampson/MOR, [address] [place] [phone]

## 2022-08-23 NOTE — CONSULTS
[facility]  ADL Assistance: Needs assistance  Homemaking Assistance: Needs assistance  Homemaking Responsibilities: No  Ambulation Assistance: Non-ambulatory  Transfer Assistance: Needs assistance  Active : No  Occupation: Retired    Examination of body systems (includes body structures/functions, activity/participation limitations):  Observation:  Pt supine in bed upon arrival and agreeable to therapy  Vision:  WFL  Hearing:  WFL  Cardiopulmonary:  no O2 needs  Cognition: WFL, see OT/SLP note for further evaluation. Musculoskeletal  ROM R/L:  WFL. Strength R/L:  3-/5, significant impairment in function and endurance. Neuro:  pt states limited sensation in R LE      Mobility:  Rolling L/R:  mod A with cues for sequencing and hand placement  Supine to sit:  max A with assist at R LE, hips, and trunk  Sit to supine: mod A x2 with assist at R LE, hips, and trunk  Transfers: NT due to safety concerns and pt dependent transfer at baseline  Sitting balance:  fair-, pt sat EOB ~10 minutes with varying assist from Mod A due to retropulsion but with short bouts of CGA/SBA. Cues provided for sitting posture and hand placement throughout  Standing balance:  NT, pt does not stand at baseline. Gait: NT, pt does not ambulate at baseline    [facility] 6 Clicks Inpatient Mobility:  AM-PAC Inpatient Mobility Raw Score : 8    Safety: patient left supine in bed with alarm on, call light within reach, RN notified, gait belt used. Assessment:  Pt is an 80 y.o. female admitted to the hospital for an ulcer of skin with necrosis of bone. Pt underwent a L AKA on 8/22/2022. Pt is typically dependent for all mobility and transfers. Pt is currently performing bed mobility mod A x2, unable to transfer, and sitting balance of mod A. Pt is presenting with decreased endurance, increased pain, impaired balance, impaired transfers, impaired strength.  Pt would benefit from continued acute care PT as well as SNF placement to continue to address impairments. Complexity: moderate    Prognosis: Good, no significant barriers to participation at this time.      Plan: 2-3 times per week/week     Equipment: TBD at next level of care    Goals:  Short Term Goals  Time Frame for Short term goals: 1 week  Short term goal 1: Pt to complete all bed mobility max  A x1  Short term goal 2: pt to sit EOB ~10 mintues SBA  Short term goal 3: Pt to complete stand pivot transfer with LRAD max A       Treatment plan:  Bed mobility, transfers, balance, gait, TA, TX    Recommendations for NURSING mobility: GINNY    Time:   Time in: 1418  Time out: 1446  Timed treatment minutes: 18  Total time: 28    Electronically signed by:    Caprice Shrestha PT  8/23/2022, 3:44 PM

## 2022-08-23 NOTE — OP NOTE
[address] [place] [address], [address] [place] [address]                                OPERATIVE REPORT    PATIENT NAME: Ru Noriega                      :        1935  MED REC NO:   [id number]                          ROOM:       1101  ACCOUNT NO:   [de-identified]                           ADMIT DATE: 2022  PROVIDER:     Blanca Leach MD    DATE OF PROCEDURE:  2022    PREOPERATIVE DIAGNOSIS:  Gangrene of left lower extremity. POSTOPERATIVE DIAGNOSIS:   Gangrene of left lower extremity. OPERATION PERFORMED:  Left above-knee amputation. SURGEON:  Blanca Leach MD.    ANESTHESIA:  General anesthesia plus 40 mL of 0.25% Marcaine with  epinephrine used locally. BLOOD LOSS:  50 mL. DRAINS:  None. COMPLICATIONS:  None. SPECIMEN:  Left lower extremity. PROCEDURE IN DETAIL:  The patient was brought to the operating room. Preoperative antibiotics were administered. General anesthetic was  administered. Left leg prepped and draped in normal sterile fashion. A  10-blade was used to make a fishmouth incision above the knee in the  distal thigh through skin and subcutaneous tissue. Electrocautery used  for hemostasis. Saphenous vein was identified, clamped, cut and tied  with silk suture. Electrocautery dissection was used to divide  circumferentially the subcutaneous tissue down to the fascial level. The fascia was incised. The muscular tissue was divided using  electrocautery down to the anterior aspect of the femur. I divided the  _____ muscles with electrocautery, exposed the femoral vein and femoral  artery which were clearly isolated and both structures were clamped  proximally and distally and divided. Each structure was suture ligated  with 2-0 silk ties. I then was able to score the periosteum of the  femur and elevate this with periosteal elevator.   I used an electric saw  to divide the femur and

## 2022-08-23 NOTE — CARE COORDINATION
CM reviewed notes. Pt  POD1 following L leg amp above knee. CM collaborated with Dr Neha Luo. PT/OT ordered for poss [facility]hab. [facility]. CM placed a white board. CM team will follow [address] CM reviewed notes from PT/OT recommendation is for SNF. CM into see pt to initiate a safe discharge plan. Cm introduced self and explained role of CM. Pt is kind, alert and oriented. Pt lives at home alone. She is very well supported by her family. Her dtr lives in a mobile home on her property. Grandchildren come in the morning for her breakfast, they get her in her lift chr, they prepare lunch for her and return at dinner and bed time and provide the same care. Pt uses a depends and when grandchildren are there they use the elevated commode. Pt has [facility] and palliative for her CHF. Pt also informed that she has two frozen shoulders. Pt has PCP. Pt has insurance and able to obtain her prescriptions. Hospice aides come twice per week and help with her with her shower using the shower chr. Nursing comes twice a week. Pt would prefer to return home with the same care that she has in place. Pt informed CM that she did not sign off of hospice and was told that this stay would be covered under hospice. CM called [place] to discuss. CM spoke with Kb Mendez. Pt is still on hospice and they will resume care when she is discharged. [facility] [address] hospice [phone]  Fax [phone]  Discharge plan is for pt to return home with her family support and followed by [facility]. [facility] [phone]  Fax [phone]  CM provided card and encouraged to call for any needs or concern. CM is available if any needs arise. 1616 CM called Jennifer and left a VM for her to review notes for poss ARU. Due to her limitations, most likely not appropriate.  [address]

## 2022-08-23 NOTE — CONSULTS
[facility] Continence Nurse  Consult Note       Benedicto Diaz Sheets  AGE: 80 y.o. GENDER: female  : 1935  TODAY'S DATE:  2022    Subjective:     Reason for  Evaluation and Assessment: wound care evrufino Alves is a 80 y.o. female referred by:   [x] Physician  [] Nursing  [] Other:     Wound Identification:  Wound Type: pressure  Contributing Factors: chronic pressure, decreased mobility, and malnutrition        PAST MEDICAL HISTORY        Diagnosis Date    CAD (coronary artery disease)     Cancer ([address]     UTERINE    CRI (chronic renal insufficiency) 2010    STAGE 3 RENAL DISEASE    DJD (degenerative joint disease)     H/O cardiomyopathy     H/O cardiovascular stress test 14,    2012 ([facility]) NORMAL STUDY. EF 70%. H/O cataract     BILATERAL    H/O echocardiogram 2010EF=>55%. MOD TR and mild MR.     8/10 BORDERLINE CONCENTRIC LEFT VENTRICULAR HYPERTROPHY. ABN. LEFT VENT. DIASTOLIC FUNCTION STAGE 1. MILD MITRIL ANNULAR CALCIFICATION. MILD TRICUSPID REGURG. NOTED. H/O echocardiogram 2015    EF >55%. Normal LV systolic function. Severe pulm HTN. Mod-severe TR. Mild MR.     H/O hiatal hernia     H/O mitral valve repair 2010    H/O transesophageal echocardiography (BRITTANI) for monitoring 2010    3-4+ MITRIL REGURG. AND MILD TRICUSPID REGURG. History of IBS     History of PTCA 2009    STENT (3.0 X 12 PROMUS) RCA. MILD OSTIAL DISEASE LM. CRITICAL OSTIAL DISEASE RCA. ELEVATED SYSTEMIC PRESSURES AT REST. MILD MITRIL [address] History of stress test 2021    normal    Hx of cardiovascular stress test 2015    cardiolite-normal    Hx of Doppler echocardiogram 2013    EF55%  mild mitral regurg, moderate tricuspid regurg, right ventricular systolic pressure elevated at 60mmHg    Hx of Doppler echocardiogram 2018    LV function and size are normal, Ejection Fraction 50-55 %.  Normal left ventricular wall thickness. No regional wall motion abnormalities were detected. Normal diastolic filling pattern for age. 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 CABG X 1 W/SVG TO RCA. FOOT SURGERY  10-    LEFT    HYSTERECTOMY (CERVIX STATUS UNKNOWN)  1990    JOINT REPLACEMENT  1989 AND 2001    BILATERAL HIPS       FAMILY HISTORY    Family History   Problem Relation Age of Onset    High Blood Pressure Mother     Heart Disease Father     High Blood Pressure Father     Cancer Brother         COLON       SOCIAL HISTORY    Social History     Tobacco Use    Smoking status: Never    Smokeless tobacco: Never    Tobacco comments:     Reviewed 11/1/16   Substance Use Topics    Alcohol use: No     Alcohol/week: 0.0 standard drinks    Drug use: No       ALLERGIES    Allergies   Allergen Reactions    Niaspan [Niacin Er] Other (See Comments)     FLUSHING      Clopidogrel Rash    Hyzaar [Losartan Potassium-Hctz] Rash       MEDICATIONS    No current facility-administered medications on file prior to encounter.      Current Outpatient Medications on File Prior to Encounter   Medication Sig Dispense Refill    nebivolol (BYSTOLIC) 20 MG TABS tablet Take 1 tablet by mouth daily 180 tablet 3    digoxin (LANOXIN) 125 MCG tablet TAKE 1 TABLET DAILY 90 tablet 3    omega-3 acid ethyl esters (LOVAZA) 1 g capsule Take 1 capsule by mouth 2 times daily (Patient not taking: Reported on 8/22/2022) 180 capsule 3    nitroGLYCERIN (NITROSTAT) 0.4 MG SL tablet Place 1 tablet under the tongue every 5 minutes as needed for Chest pain 25 tablet 3    Icosapent Ethyl (VASCEPA) 1 g CAPS capsule Take 2 capsules by mouth 2 times daily 120 capsule 3    sertraline (ZOLOFT) 50 MG tablet TAKE 1 TABLET BY MOUTH EVERY DAY      gabapentin (NEURONTIN) 100 MG capsule Take 100 mg by mouth 3 times daily      amLODIPine (NORVASC) 2.5 MG tablet Take 1 tablet by mouth daily (Patient not taking: Reported on 8/22/2022) 30 tablet 3    valsartan-hydrochlorothiazide (DIOVAN-HCT) 320-25 MG per tablet Take 1 tablet by mouth daily       HYDROcodone-acetaminophen (VICODIN) 5-500 MG per tablet Take 1 tablet by mouth every 6 hours as needed       furosemide (LASIX) 20 MG tablet Take 20 mg by mouth daily  (Patient not taking: Reported on 8/22/2022)      esomeprazole Magnesium (NEXIUM) 40 MG PACK Take 40 mg by mouth daily. dicyclomine (BENTYL) 20 MG tablet Take 20 mg by mouth nightly. aspirin 81 MG tablet Take 81 mg by mouth daily. azelastine (OPTIVAR) 0.05 % ophthalmic solution 1 drop 2 times daily.              Objective:      BP (!) 165/59   Pulse 74   Temp 97.9 °F (36.6 °C) (Oral)   Resp 17   Ht 5' (1.524 m)   Wt 126 lb (57.2 kg)   SpO2 98%   BMI 24.61 kg/m²   Aquilino Risk Score: Aquilino Scale Score: 11    LABS    CBC:   Lab Results   Component Value Date/Time    WBC 6.4 10/16/2017 12:00 AM    RBC 4.35 10/16/2017 12:00 AM    HGB 14.2 10/16/2017 12:00 AM    HCT 42.1 10/16/2017 12:00 AM    MCV 96.8 10/16/2017 12:00 AM    MCH 32.6 10/16/2017 12:00 AM    MCHC 34.3 09/02/2014 12:00 AM     10/16/2017 12:00 AM     CMP:    Lab Results   Component Value Date/Time     10/16/2017 12:00 AM    K 4.5 10/16/2017 12:00 AM    CL 97 10/16/2017 12:00 AM    CO2 28 10/16/2017 12:00 AM    BUN 64 10/16/2017 12:00 AM    CREATININE 1.2 10/16/2017 12:00 AM    LABGLOM 42 10/18/2016 12:00 AM    GLUCOSE 124 10/16/2017 12:00 AM    PROT 7.0 06/14/2018 01:30 PM    LABALBU 4.5 06/14/2018 01:30 PM    CALCIUM 10.5 10/16/2017 12:00 AM    BILITOT 0.4 06/14/2018 01:30 PM    ALKPHOS 35 06/14/2018 01:30 PM    AST 20 06/14/2018 01:30 PM    ALT 13 06/14/2018 01:30 PM     Albumin:    Lab Results   Component Value Date/Time    LABALBU 4.5 06/14/2018 01:30 PM     PT/INR:  No results found for: PROTIME, INR  HgBA1c:    Lab Results   Component Value Date/Time    LABA1C 5.5 10/16/2017 12:00 AM         Assessment:     Patient Active Problem List   Diagnosis    Hypertension    CAD (coronary artery disease)    S/P CABG x 1    VHD (valvular heart disease)    Mitral regurgitation    Tricuspid regurgitation    H/O mitral valve repair    Cancer ([facility] [address]    History of IBS    H/O hiatal hernia    Hyperlipidemia    History of PTCA    H/O cardiomyopathy    DJD (degenerative joint disease)    Pulmonary HTN (HCC)    Hx of glaucoma    SOB (shortness of breath)    Leg edema    Chronic venous insufficiency    Osteomyelitis of ankle and foot (HCC)    Ulcer of skin, with necrosis of bone (HCC)    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, left leg (HCC)       Measurements:  Wound 08/23/22 Sacrum cluster (Active)   Wound Image   08/23/22 1400   Wound Etiology Deep tissue/Injury 08/23/22 1400   Dressing Status New dressing applied 08/23/22 1400   Wound Cleansed Cleansed with saline 08/23/22 1400   Dressing/Treatment Collagen;Silicone border 24/35/20 1400   Wound Length (cm) 4 cm 08/23/22 1400   Wound Width (cm) 5 cm 08/23/22 1400   Wound Depth (cm) 0.1 cm 08/23/22 1400   Wound Surface Area (cm^2) 20 cm^2 08/23/22 1400   Wound Volume (cm^3) 2 cm^3 08/23/22 1400   Distance Tunneling (cm) 0 cm 08/23/22 1400   Tunneling Position ___ O'Clock 0 08/23/22 1400   Undermining Starts ___ O'Clock 0 08/23/22 1400   Undermining Ends___ O'Clock 0 08/23/22 1400   Undermining Maxium Distance (cm) 0 08/23/22 1400   Wound Assessment Purple/maroon;Pink/red 08/23/22 1400   Drainage Amount Moderate 08/23/22 1400   Drainage Description Serosanguinous 08/23/22 1400   Odor None 08/23/22 1400   Roberta-wound Assessment Fragile 08/23/22 1400   Margins Defined edges 08/23/22 1400   Wound Thickness Description not for Pressure Injury Partial thickness 08/23/22 1400   Number of days: 0       Wound 08/23/22 Back Lower;Right (Active)   Wound Image   08/23/22 1400   Wound Etiology Pressure Unstageable 08/23/22 1400   Dressing Status New dressing applied 08/23/22 1400   Wound Cleansed Cleansed with saline 08/23/22 1400   Dressing/Treatment Moist to dry;Silicone border 92/45/08 1400   Wound Length (cm) 1.5 cm 08/23/22 1400   Wound Width (cm) 3 cm 08/23/22 1400   Wound Depth (cm) 0.2 cm 08/23/22 1400   Wound Surface Area (cm^2) 4.5 cm^2 08/23/22 1400   Wound Volume (cm^3) 0.9 cm^3 08/23/22 1400   Distance Tunneling (cm) 0 cm 08/23/22 1400   Tunneling Position ___ O'Clock 0 08/23/22 1400   Undermining Starts ___ O'Clock 0 08/23/22 1400   Undermining Ends___ O'Clock 0 08/23/22 1400   Undermining Maxium Distance (cm) 0 08/23/22 1400   Wound Assessment Sedley/red;Slough 08/23/22 1400   Drainage Amount Moderate 08/23/22 1400   Drainage Description Serosanguinous; Yellow 08/23/22 1400   Odor None 08/23/22 1400   Roberta-wound Assessment Fragile 08/23/22 1400   Margins Defined edges 08/23/22 1400   Wound Thickness Description not for Pressure Injury Full thickness 08/23/22 1400   Number of days: 0       Response to treatment:  Well tolerated by patient. Pain Assessment:  Severity:  moderate  Quality of pain: sore  Wound Pain Timing/Severity: when turning  Premedicated: no    Plan:     Plan of Care: Wound 08/23/22 Sacrum cluster-Dressing/Treatment: Collagen, Silicone border  Wound 08/23/22 Back Lower;Right-Dressing/Treatment: Moist to dry, Silicone border    Patient in bed agreeable to wound care eval. Pt has a rt back pressure injury wound unstageable and sacral cluster deep tissue injury/stage 2. Cleansed with NS. Measured and pictured. Applied ns moist gauze and foam border to rt back wound. Recommend santyl.  Sacrum applied collagen and foam border. Pt turned to lt side with pillow support. Rt heel intact and floated. 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 Atmos air pump on the bed. Ordered dolphin mattress. Pt is at high risk for skin breakdown AEB patrick. Follow patrick orders. Specialty Bed Required : yes  [x] Low Air Loss   [x] Pressure Redistribution  [] Fluid Immersion  [] Bariatric  [] Total Pressure Relief  [] Other:     Discharge Plan:  Placement for patient upon discharge: tbd  Hospice Care: tbd  Patient appropriate for Outpatient [address]: [facility]    Patient/Caregiver Teaching:  Level of patient/caregiver understanding able to: pt voiced understanding. Electronically signed by Rachel Hernandez.  TEOFILO Chappell, on 8/23/2022 at 3:12 PM

## 2022-08-23 NOTE — PROGRESS NOTES
Comprehensive Nutrition Assessment    Type and Reason for Visit:  Positive Nutrition Screen    Nutrition Recommendations/Plan:   Add frozen and standard oral nutrition supplements  Continue current diet as ordered  Will monitor po intake, weight trends, poc     Malnutrition Assessment:  Malnutrition Status:  Insufficient data (08/23/22 1548)    Context:  Acute Illness       Nutrition Assessment:    Pt admitted for ulcer of skin with necrosis of bone, PMH: VHD, s/p CABG, HTN, HLD, CAD, uterine ca, positive nutrition screen for weight loss and poor appetite, pt currently regular diet, no po intake documented since admission, POD #1 s/p left AKA, no weight loss per chart review, +wounds, will follow at high nutrition risk    Nutrition Related Findings:      Wound Type: Multiple, Pressure Injury, Unstageable, Deep Tissue Injury (s/p lt AKA)       Current Nutrition Intake & Therapies:    Average Meal Intake: Unable to assess  Average Supplements Intake: None Ordered  ADULT DIET;  Regular    Anthropometric Measures:  Height: 5' (152.4 cm)    Current Body Weight: 126 lb 1.7 oz (57.2 kg), Weight Source: Bed Scale  Usual Body Weight: 112 lb 7 oz (51 kg) ((8/22/22) per chart review)  Adjusted DBW for AKA: 90#, pt is 140% of adjusted DBW for AKA  % Weight Change (Calculated): 12.2  Estimated Daily Nutrient Needs:  Energy Requirements Based On: Kcal/kg  Weight Used for Energy Requirements: Current  Energy (kcal/day): 3696-4964 (25-30 kcal/kg)  Weight Used for Protein Requirements: Current  Protein (g/day): 74-92 (1.3-1.6 g/kg)  Method Used for Fluid Requirements: 1 ml/kcal  Nutrition Diagnosis:   Increased nutrient needs related to  healing as evidenced by unstageable pressure injury, s/p lt AKA    Nutrition Interventions:   Food and/or Nutrient Delivery: Continue Current Diet, Start Oral Nutrition Supplement  Nutrition Education/Counseling: No recommendation at this time  Coordination of Nutrition Care: Continue to monitor

## 2022-08-23 NOTE — PROGRESS NOTES
Postop day #1 from a left above-knee amputation. Patient awake and alert but has pain. Has only received 1 oral pain medication this morning morphine has been written and have instructed the nurse to give her a dose of morphine. The patient is hemodynamically stable her dressing is intact  Patient's Scott catheter is intact and the Scott catheter should remain in place due to the patient's pain and limited mobility amputation and that she has chronic urinary incontinence.   Occupational Therapy physical therapy ordered  ARU placement hopeful  Case management to assist

## 2022-08-24 PROBLEM — L98.494: Status: RESOLVED | Noted: 2022-08-17 | Resolved: 2022-08-24

## 2022-08-24 PROBLEM — I70.462: Status: RESOLVED | Noted: 2022-08-23 | Resolved: 2022-08-24

## 2022-08-24 PROCEDURE — 6360000002 HC RX W HCPCS: Performed by: SURGERY

## 2022-08-24 PROCEDURE — 97535 SELF CARE MNGMENT TRAINING: CPT

## 2022-08-24 PROCEDURE — 6370000000 HC RX 637 (ALT 250 FOR IP): Performed by: SURGERY

## 2022-08-24 PROCEDURE — 97530 THERAPEUTIC ACTIVITIES: CPT

## 2022-08-24 PROCEDURE — 1200000000 HC SEMI PRIVATE

## 2022-08-24 PROCEDURE — 2580000003 HC RX 258: Performed by: SURGERY

## 2022-08-24 PROCEDURE — 94761 N-INVAS EAR/PLS OXIMETRY MLT: CPT

## 2022-08-24 RX ORDER — HYDROCODONE BITARTRATE AND ACETAMINOPHEN 5; 325 MG/1; MG/1
1 TABLET ORAL EVERY 4 HOURS PRN
Qty: 28 TABLET | Refills: 0 | Status: SHIPPED | OUTPATIENT
Start: 2022-08-24 | End: 2022-08-31

## 2022-08-24 RX ADMIN — ENOXAPARIN SODIUM 40 MG: 100 INJECTION SUBCUTANEOUS at 09:38

## 2022-08-24 RX ADMIN — SODIUM CHLORIDE, POTASSIUM CHLORIDE, SODIUM LACTATE AND CALCIUM CHLORIDE: 600; 310; 30; 20 INJECTION, SOLUTION INTRAVENOUS at 04:59

## 2022-08-24 RX ADMIN — OXYCODONE HYDROCHLORIDE AND ACETAMINOPHEN 1 TABLET: 5; 325 TABLET ORAL at 15:08

## 2022-08-24 RX ADMIN — OXYCODONE HYDROCHLORIDE AND ACETAMINOPHEN 1 TABLET: 5; 325 TABLET ORAL at 04:57

## 2022-08-24 RX ADMIN — OXYCODONE HYDROCHLORIDE AND ACETAMINOPHEN 1 TABLET: 5; 325 TABLET ORAL at 22:34

## 2022-08-24 RX ADMIN — ONDANSETRON 4 MG: 2 INJECTION INTRAMUSCULAR; INTRAVENOUS at 08:32

## 2022-08-24 ASSESSMENT — PAIN DESCRIPTION - FREQUENCY
FREQUENCY: INTERMITTENT
FREQUENCY: INTERMITTENT
FREQUENCY: CONTINUOUS
FREQUENCY: CONTINUOUS

## 2022-08-24 ASSESSMENT — PAIN SCALES - GENERAL
PAINLEVEL_OUTOF10: 6
PAINLEVEL_OUTOF10: 5
PAINLEVEL_OUTOF10: 2
PAINLEVEL_OUTOF10: 0
PAINLEVEL_OUTOF10: 5
PAINLEVEL_OUTOF10: 0
PAINLEVEL_OUTOF10: 4
PAINLEVEL_OUTOF10: 6

## 2022-08-24 ASSESSMENT — PAIN DESCRIPTION - PAIN TYPE
TYPE: SURGICAL PAIN

## 2022-08-24 ASSESSMENT — PAIN DESCRIPTION - ONSET
ONSET: ON-GOING
ONSET: GRADUAL
ONSET: AWAKENED FROM SLEEP

## 2022-08-24 ASSESSMENT — PAIN DESCRIPTION - LOCATION
LOCATION: LEG

## 2022-08-24 ASSESSMENT — PAIN SCALES - WONG BAKER: WONGBAKER_NUMERICALRESPONSE: 0

## 2022-08-24 ASSESSMENT — PAIN DESCRIPTION - ORIENTATION
ORIENTATION: LEFT
ORIENTATION: UPPER
ORIENTATION: UPPER
ORIENTATION: LEFT
ORIENTATION: LEFT

## 2022-08-24 ASSESSMENT — PAIN - FUNCTIONAL ASSESSMENT
PAIN_FUNCTIONAL_ASSESSMENT: PREVENTS OR INTERFERES SOME ACTIVE ACTIVITIES AND ADLS

## 2022-08-24 ASSESSMENT — PAIN DESCRIPTION - DESCRIPTORS
DESCRIPTORS: THROBBING
DESCRIPTORS: ACHING
DESCRIPTORS: THROBBING
DESCRIPTORS: OTHER (COMMENT)

## 2022-08-24 NOTE — DISCHARGE INSTRUCTIONS
No driving while taking narcotic pain medication and released by physician. May shower but no baths, hot tubs or swimming. Keep dressing clean and dry. No lifting over 10 lbs. Notify physician if temperature is greater than 101.5, redness or swelling occurs at incision site, increased drainage from incision site, or pain is not controlled by prescribed pain medication.

## 2022-08-24 NOTE — PROGRESS NOTES
Patient's pain is better controlled  She is not a candidate for ARU  Plan discharge tomorrow morning  Remove Scott catheter tomorrow  Position oral pain medication  Outpatient follow-up

## 2022-08-24 NOTE — CARE COORDINATION
Received referral for ARU. Reviewed patients clinicals and PT/OT notes. Patient not ARU appropriate d/t her being functionally low level at baseline, not enough functional gains to be made on ARU.

## 2022-08-24 NOTE — PROGRESS NOTES
Occupational Therapy Treatment Note    Name: Christina Jacobsen MRN: [id number] :   1935   Date:  2022   Admission Date: 2022 Room:  [address] [place] [address]-A     Primary Problem:  The primary encounter diagnosis was Pre-op testing. Diagnoses of Ulcer of skin, with necrosis of bone ([address] and Ulcer of skin, with necrosis of bone ([address] were also pertinent to this visit. Restrictions/Precautions:  General Precautions, High Fall Risk, Lt AKA, IV, Scott, Bed exit alarm    Communication with other providers: Per chart review and Nurse patient is appropriate for therapeutic intervention. Subjective:  Patient states:  Agreeable to OT therapy  Pain:   Location, Type, Intensity (0/10 to 10/10):  4/10 back, LLE    Objective:    Observation: In high malone's position upon arrival.   Objective Measures:  Alert and oriented    Treatment, including education:  Self Care Training:   Cues were given for safety, sequence, UE/LE placement, visual cues, and balance. Activities performed today included UB bathing andpersonal hygiene at bed level due to pain and grooming task on EOB with Max A for trunk support. Therapeutic Activity Training:   Therapeutic activity training was instructed today. Cues were given for safety, sequence, UE/LE placement, awareness, and balance.     Bed mobility:Max A with all aspects utilizing bed features, completed 2 reps in order to improve IND utilizing bed rails, demo F- follow thru due to pain and LUE AROM  Sitting balance:P with 1 UE support,tolerated ~9 mins with Min-Max with 1UE support;  F- with 2 UE support on EOB for ~2 mins with CGA-Min A  Sit/stand transfers:Attempted but pt unable due to pain and nausea    Activities performed today included bed mobility training, transfers, functional mobility  to increase strength, activity tolerance to facilitate IND c ADL tasks, func transfers / mobility with G safety awareness carryover      Assessment / Impression:    Patient's tolerance of treatment: good  Adverse Reaction: none  Significant change in status and impact:  none  Barriers to improvement: pain, activity tolerance, strength    Safety  Patient educated on role of OT , benefits of OT and rationale for therapeutic intervention. Patient safely in bed + alarm set at end of session, with call light/phone in reach, and nursing aware. Gait belt was used for func transfers / mobility.     Plan for Next Session:    Continue OT POC    Time in:  899  Time out:  923  Timed treatment minutes:  39  Total treatment time:  39      Electronically signed by:    MUSTAPHA Quiles,   8/24/2022, 8:31 AM

## 2022-08-25 VITALS
TEMPERATURE: 98.4 F | WEIGHT: 119.27 LBS | HEART RATE: 81 BPM | HEIGHT: 60 IN | BODY MASS INDEX: 23.42 KG/M2 | OXYGEN SATURATION: 97 % | DIASTOLIC BLOOD PRESSURE: 47 MMHG | RESPIRATION RATE: 18 BRPM | SYSTOLIC BLOOD PRESSURE: 144 MMHG

## 2022-08-25 PROCEDURE — 6360000002 HC RX W HCPCS: Performed by: SURGERY

## 2022-08-25 PROCEDURE — 94761 N-INVAS EAR/PLS OXIMETRY MLT: CPT

## 2022-08-25 PROCEDURE — 6370000000 HC RX 637 (ALT 250 FOR IP): Performed by: SURGERY

## 2022-08-25 RX ADMIN — OXYCODONE HYDROCHLORIDE AND ACETAMINOPHEN 1 TABLET: 5; 325 TABLET ORAL at 09:26

## 2022-08-25 RX ADMIN — ONDANSETRON 4 MG: 2 INJECTION INTRAMUSCULAR; INTRAVENOUS at 09:26

## 2022-08-25 ASSESSMENT — ENCOUNTER SYMPTOMS: SHORTNESS OF BREATH: 1

## 2022-08-25 ASSESSMENT — PAIN DESCRIPTION - LOCATION
LOCATION: LEG
LOCATION: BACK

## 2022-08-25 ASSESSMENT — PAIN - FUNCTIONAL ASSESSMENT
PAIN_FUNCTIONAL_ASSESSMENT: ACTIVITIES ARE NOT PREVENTED
PAIN_FUNCTIONAL_ASSESSMENT: ACTIVITIES ARE NOT PREVENTED

## 2022-08-25 ASSESSMENT — PAIN DESCRIPTION - PAIN TYPE: TYPE: ACUTE PAIN;SURGICAL PAIN

## 2022-08-25 ASSESSMENT — PAIN SCALES - GENERAL
PAINLEVEL_OUTOF10: 5
PAINLEVEL_OUTOF10: 3
PAINLEVEL_OUTOF10: 6
PAINLEVEL_OUTOF10: 3
PAINLEVEL_OUTOF10: 5

## 2022-08-25 ASSESSMENT — PAIN DESCRIPTION - FREQUENCY: FREQUENCY: CONTINUOUS

## 2022-08-25 ASSESSMENT — PAIN DESCRIPTION - DESCRIPTORS: DESCRIPTORS: SHARP;ACHING

## 2022-08-25 ASSESSMENT — PAIN DESCRIPTION - ORIENTATION: ORIENTATION: RIGHT

## 2022-08-25 ASSESSMENT — PAIN DESCRIPTION - ONSET: ONSET: ON-GOING

## 2022-08-25 NOTE — PROGRESS NOTES
Dr. Felipa Sims at bedside to see pt. Dressing removed by Dr. Felipa Sims. Instructions given to redress incision. Pt stated that she has enough pain meds at home at this time and would not need script for pain meds at d/c. Instructed by Dr. Felipa Sims to shred printed prescription. Unsigned script placed in shred container.

## 2022-08-25 NOTE — ANESTHESIA PRE PROCEDURE
Department of Anesthesiology  Preprocedure Note       Name:  Richard Aggarwal   Age:  80 y.o.  :  1935                                          MRN:  [id number]         Date:  2022      Surgeon: Jessie Mohs):  Ander Amin MD    Procedure: Procedure(s):  LEFT LEG AMPUTATION ABOVE KNEE    Medications prior to admission:   Prior to Admission medications    Medication Sig Start Date End Date Taking? Authorizing Provider   HYDROcodone-acetaminophen (NORCO) 5-325 MG per tablet Take 1 tablet by mouth every 4 hours as needed for Pain (may take 2 tabs every 6 hours for pain level 6-10) for up to 7 days. Intended supply: 5 days.  Take lowest dose possible to manage pain 22 Yes Ander Amin MD   nebivolol (BYSTOLIC) 20 MG TABS tablet Take 1 tablet by mouth daily 22   Eddie Lucas MD   digoxin (LANOXIN) 125 MCG tablet TAKE 1 TABLET DAILY 22   Eddie Lucas MD   omega-3 acid ethyl esters (LOVAZA) 1 g capsule Take 1 capsule by mouth 2 times daily  Patient not taking: Reported on 2022  Eddie Lucas MD   nitroGLYCERIN (NITROSTAT) 0.4 MG SL tablet Place 1 tablet under the tongue every 5 minutes as needed for Chest pain 3/29/21   Eddie Lucas MD   Icosapent Ethyl (VASCEPA) 1 g CAPS capsule Take 2 capsules by mouth 2 times daily 11/10/20   Eddie Lucas MD   sertraline (ZOLOFT) 50 MG tablet TAKE 1 TABLET BY MOUTH EVERY DAY 20   Historical Provider, MD   gabapentin (NEURONTIN) 100 MG capsule Take 100 mg by mouth 3 times daily    Historical Provider, MD   amLODIPine (NORVASC) 2.5 MG tablet Take 1 tablet by mouth daily  Patient not taking: Reported on 2022  Eddie Lucas MD   valsartan-hydrochlorothiazide (DIOVAN-HCT) 320-25 MG per tablet Take 1 tablet by mouth daily     Historical Provider, MD   HYDROcodone-acetaminophen (VICODIN) 5-500 MG per tablet Take 1 tablet by mouth every 6 hours as needed     Historical Provider, MD esomeprazole Magnesium (NEXIUM) 40 MG PACK Take 40 mg by mouth daily. Historical Provider, MD   dicyclomine (BENTYL) 20 MG tablet Take 20 mg by mouth nightly. Historical Provider, MD   aspirin 81 MG tablet Take 81 mg by mouth daily. Historical Provider, MD   azelastine (OPTIVAR) 0.05 % ophthalmic solution 1 drop 2 times daily. Historical Provider, MD   furosemide (LASIX) 20 MG tablet Take 20 mg by mouth daily   Patient not taking: Reported on 8/22/2022 8/24/22  Historical Provider, MD       Current medications:    Current Facility-Administered Medications   Medication Dose Route Frequency Provider Last Rate Last Admin    collagenase ointment   Topical Daily Renata Warren MD        morphine sulfate (PF) injection 4 mg  4 mg IntraVENous Q2H PRN Renata Warren MD   4 mg at 08/23/22 0959    oxyCODONE-acetaminophen (PERCOCET) 5-325 MG per tablet 1 tablet  1 tablet Oral Q4H PRN Renata Warren MD   1 tablet at 08/24/22 2234    ondansetron (ZOFRAN) injection 4 mg  4 mg IntraVENous Q6H PRN Renata Warren MD   4 mg at 08/24/22 3254       Allergies:     Allergies   Allergen Reactions    Niaspan [Niacin Er] Other (See Comments)     FLUSHING      Clopidogrel Rash    Hyzaar [Losartan Potassium-Hctz] Rash       Problem List:    Patient Active Problem List   Diagnosis Code    Hypertension I10    CAD (coronary artery disease) I25.10    S/P CABG x 1 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    VHD (valvular heart disease) I38    Mitral regurgitation I34.0    Tricuspid regurgitation I07.1    H/O mitral valve repair Z98.890    Cancer ([facility] C80.1    History of IBS Z87.19    H/O hiatal hernia Z87.19    Hyperlipidemia E78.5    History of PTCA Z80.64    H/O cardiomyopathy Z86.79    DJD (degenerative joint disease) M19.90    Pulmonary HTN ([facility] I27.20    Hx of glaucoma Z86.69    SOB (shortness of breath) R06.02    Leg edema R60.0    Chronic venous insufficiency I87.2    Osteomyelitis of ankle and foot ([facility] M86.9       Past Medical History:        Diagnosis Date    CAD (coronary artery disease)     Cancer ([facility]     UTERINE    CRI (chronic renal insufficiency) 03/2010    STAGE 3 RENAL DISEASE    DJD (degenerative joint disease)     H/O cardiomyopathy     H/O cardiovascular stress test 9-9-14,    5- 9/14 ([facility]) NORMAL STUDY. EF 70%.  H/O cataract     BILATERAL    H/O echocardiogram 5-6-13 08- 5/13EF=>55%. MOD TR and mild MR.     8/10 BORDERLINE CONCENTRIC LEFT VENTRICULAR HYPERTROPHY. ABN. LEFT VENT. DIASTOLIC FUNCTION STAGE 1. MILD MITRIL ANNULAR CALCIFICATION. MILD TRICUSPID REGURG. NOTED.    H/O echocardiogram 08/12/2015    EF >55%. Normal LV systolic function. Severe pulm HTN. Mod-severe TR. Mild MR.     H/O hiatal hernia     H/O mitral valve repair 07/2010    H/O transesophageal echocardiography ([facility]) for monitoring 06/30/2010    3-4+ MITRIL REGURG. AND MILD TRICUSPID REGURG.  History of IBS     History of PTCA 11/23/2009    STENT (3.0 X 12 PROMUS) RCA. MILD OSTIAL DISEASE LM. CRITICAL OSTIAL DISEASE RCA. ELEVATED SYSTEMIC PRESSURES AT REST. MILD MITRIL [address]  History of stress test 04/12/2021    normal    Hx of cardiovascular stress test 08/17/2015    cardiolite-normal    Hx of Doppler echocardiogram 05/06/2013    EF55%  mild mitral regurg, moderate tricuspid regurg, right ventricular systolic pressure elevated at 60mmHg    Hx of Doppler echocardiogram 06/25/2018    LV function and size are normal, Ejection Fraction 50-55 %. Normal left ventricular wall thickness. No regional wall motion abnormalities were detected. Normal diastolic filling pattern for age.     Hx of glaucoma     RIGHT    Hyperlipidemia     Hypertension     Leg edema     Mitral regurgitation     MILD    Pulmonary HTN (HCC)     MILD    S/P CABG x 1 07/2010    X1, mitral repair    SOB (shortness of breath)     Tricuspid regurgitation     MILD    VHD (valvular heart disease)        Past AM       CMP:   Lab Results   Component Value Date/Time     10/16/2017 12:00 AM    K 4.5 10/16/2017 12:00 AM    CL 97 10/16/2017 12:00 AM    CO2 28 10/16/2017 12:00 AM    BUN 64 10/16/2017 12:00 AM    CREATININE 1.2 10/16/2017 12:00 AM    LABGLOM 42 10/18/2016 12:00 AM    GLUCOSE 124 10/16/2017 12:00 AM    PROT 7.0 06/14/2018 01:30 PM    CALCIUM 10.5 10/16/2017 12:00 AM    BILITOT 0.4 06/14/2018 01:30 PM    ALKPHOS 35 06/14/2018 01:30 PM    AST 20 06/14/2018 01:30 PM    ALT 13 06/14/2018 01:30 PM       POC Tests: No results for input(s): POCGLU, POCNA, POCK, POCCL, POCBUN, POCHEMO, POCHCT in the last 72 hours. Coags: No results found for: PROTIME, INR, APTT    HCG (If Applicable): No results found for: PREGTESTUR, PREGSERUM, HCG, HCGQUANT     ABGs: No results found for: PHART, PO2ART, PQE4PPC, QHW2BDG, BEART, B1PYDLRP     Type & Screen (If Applicable):  No results found for: LABABO, LABRH    Drug/Infectious Status (If Applicable):  No results found for: HIV, HEPCAB    COVID-19 Screening (If Applicable): No results found for: COVID19        Anesthesia Evaluation   no history of anesthetic complications:   Airway: Mallampati: II  TM distance: >3 FB   Neck ROM: full  Mouth opening: > = 3 FB   Dental:          Pulmonary:normal exam    (+) shortness of breath:                             Cardiovascular:  Exercise tolerance: poor (<4 METS),   (+) hypertension:, valvular problems/murmurs: MR, CAD:, CABG/stent: no interval change, hyperlipidemia      ECG reviewed      Echocardiogram reviewed  Stress test reviewed       Beta Blocker:  Dose within 24 Hrs         Neuro/Psych:   Negative Neuro/Psych ROS              GI/Hepatic/Renal:   (+) hiatal hernia, GERD: well controlled,           Endo/Other:    (+) malignancy/cancer. Abdominal:             Vascular:   + PVD, aortic or cerebral, . Other Findings:           Anesthesia Plan      general     ASA 4       Induction: intravenous.     MIPS: Postoperative opioids intended and Prophylactic antiemetics administered. Anesthetic plan and risks discussed with patient.                         Monique Garza MD   8/25/2022

## 2022-08-25 NOTE — CARE COORDINATION
Phoned and spoke with Oscar Haynes at [facility].  CM let her know plan for patient to discharge to home today and she voiced understanding

## 2022-08-30 NOTE — DISCHARGE SUMMARY
[address] [place] [address], [address] [place] [address]                               DISCHARGE SUMMARY    PATIENT NAME: Dinorah Lepe                      :        1935  MED REC NO:   [id number]                          ROOM:       1101  ACCOUNT NO:   [de-identified]                           ADMIT DATE: 2022  PROVIDER:     Cayla Chau MD                  DISCHARGE DATE:  2022    DISCHARGE DIAGNOSES:  1.  Gangrene of left lower extremity. 2.  Severe cardiomyopathy. 3.  Coronary artery disease. 4.  Degenerative joint disease. PROCEDURE PERFORMED:  Left above-the-knee amputation. DISPOSITION AND FOLLOWUP:  The patient was discharged to home to  continue with home health and hospice care. She will resume her  prehospitalization medications. She is on Percocet chronically for  pain. She will be seen back in the office in one week. CONDITION AT THE TIME OF DISCHARGE:  Good. HISTORY OF PRESENT ILLNESS/HOSPITAL COURSE:  An 59-year-old female who I  have been following for severe ischemia of the left lower extremity with  osteomyelitis and gangrenous changes of the foot. She had left  above-the-knee amputation, received perioperative antibiotics, IV  fluids, medical care in the perioperative period. She was able to be  discharged to home within two days with pain control, incision healing  well. She is a hospice patient and hospice will resume care for the  patient at home. She will return to see me back in the office in one  week.         Rodolfo Bonner MD    D: 2022 8:15:22       T: 2022 1:02:45     RN/[email or web address]  Job#: [id number]     Doc#: [id number]    CC:

## 2023-03-23 NOTE — PLAN OF CARE
[facility]  coding opportunities       Chart reviewed, no opportunity found:   [address]        Patients Insurance     Medicare Insurance: [facility] Problem: Discharge Planning  Goal: Discharge to home or other facility with appropriate resources  Outcome: Completed  Flowsheets (Taken 8/25/2022 0803)  Discharge to home or other facility with appropriate resources:   Identify barriers to discharge with patient and caregiver   Arrange for needed discharge resources and transportation as appropriate     Problem: Pain  Goal: Verbalizes/displays adequate comfort level or baseline comfort level  Outcome: Completed  Flowsheets (Taken 8/25/2022 0803)  Verbalizes/displays adequate comfort level or baseline comfort level:   Encourage patient to monitor pain and request assistance   Assess pain using appropriate pain scale     Problem: Safety - Adult  Goal: Free from fall injury  Outcome: Completed     Problem: ABCDS Injury Assessment  Goal: Absence of physical injury  Outcome: Completed     Problem: Skin/Tissue Integrity  Goal: Absence of new skin breakdown  Description: 1. Monitor for areas of redness and/or skin breakdown  2. Assess vascular access sites hourly  3. Every 4-6 hours minimum:  Change oxygen saturation probe site  4. Every 4-6 hours:  If on nasal continuous positive airway pressure, respiratory therapy assess nares and determine need for appliance change or resting period.   Outcome: Completed     Problem: Nutrition Deficit:  Goal: Optimize nutritional status  Outcome: Completed

## (undated) DEVICE — COUNTER NDL 30 COUNT FOAM STRP SGL MAG

## (undated) DEVICE — DRESSING,GAUZE,XEROFORM,CURAD,5"X9",ST: Brand: CURAD

## (undated) DEVICE — FOAM BUMP ROUND LARGE: Brand: MEDLINE INDUSTRIES, INC.

## (undated) DEVICE — GOWN,SIRUS,POLYRNF,BRTHSLV,XLN/XL,20/CS: Brand: MEDLINE

## (undated) DEVICE — PACK,BASIC,2 GOWNS,NO DRAPES: Brand: MEDLINE

## (undated) DEVICE — CONVERTORS STOCKINETTE: Brand: CONVERTORS

## (undated) DEVICE — SPONGE LAP W18XL18IN WHT COT 4 PLY FLD STRUNG RADPQ DISP ST

## (undated) DEVICE — MARKER SURG SKIN UTIL REGULAR/FINE 2 TIP W/ RUL AND 9 LBL

## (undated) DEVICE — TOWEL,OR,DSP,ST,BLUE,STD,6/PK,12PK/CS: Brand: MEDLINE

## (undated) DEVICE — INTENDED FOR TISSUE SEPARATION, AND OTHER PROCEDURES THAT REQUIRE A SHARP SURGICAL BLADE TO PUNCTURE OR CUT.: Brand: BARD-PARKER ® STAINLESS STEEL BLADES

## (undated) DEVICE — SUTURE PERMA-HAND SZ 3-0 L18IN 17 STRND NONABSORBABLE BLK SA64H

## (undated) DEVICE — GUIDE ENDO STEER

## (undated) DEVICE — STERILE POLYISOPRENE POWDER-FREE SURGICAL GLOVES: Brand: PROTEXIS

## (undated) DEVICE — SUTURE NONABSORBABLE MONOFILAMENT 4-0 FS-2 18 IN ETHILON 662H

## (undated) DEVICE — GLOVE SURG SZ 7 L12IN FNGR THK79MIL GRN LTX FREE

## (undated) DEVICE — SUTURE PERMAHAND SZ 2-0 L17X18IN NONABSORBABLE BLK SILK SA65H

## (undated) DEVICE — SUTURE VCRL SZ 0 L18IN ABSRB VLT L36MM CT-1 1/2 CIR J740D

## (undated) DEVICE — DRAPE,EXTREMITY,89X128,STERILE: Brand: MEDLINE

## (undated) DEVICE — SUTURE VCRL SZ 3-0 L18IN ABSRB VLT L26MM SH 1/2 CIR J774D

## (undated) DEVICE — BANDAGE,SELF ADHRNT,COFLEX,4"X5YD,STRL: Brand: COLABEL

## (undated) DEVICE — TRAY PREP DRY W/ PREM GLV 2 APPL 6 SPNG 2 UNDPD 1 OVERWRAP

## (undated) DEVICE — GLOVE SURG SZ 65 THK91MIL LTX FREE SYN POLYISOPRENE

## (undated) DEVICE — TUBING, SUCTION, 9/32" X 10', STRAIGHT: Brand: MEDLINE

## (undated) DEVICE — GLOVE SURG SZ 7 CRM LTX FREE POLYISOPRENE POLYMER BEAD ANTI

## (undated) DEVICE — BANDAGE,GAUZE,BULKEE II,4.5"X4.1YD,STRL: Brand: MEDLINE

## (undated) DEVICE — SHEET,DRAPE,53X77,STERILE: Brand: MEDLINE

## (undated) DEVICE — TIP SUCT STD FLNG RIG RIB 5IN1 CONN NVENT W/ SECUR GRP HNDL

## (undated) DEVICE — SUTURE PERMAHAND SZ 2-0 L30IN NONABSORBABLE BLK SH L26MM C016D

## (undated) DEVICE — PENCIL ES CRD L10FT HND SWCHING ROCK SWCH W/ EDGE COAT BLDE

## (undated) DEVICE — GLOVE ORANGE PI 8   MSG9080

## (undated) DEVICE — APPLICATOR MEDICATED 26 CC SOLUTION HI LT ORNG CHLORAPREP

## (undated) DEVICE — SYRINGE IRRIG 60ML SFT PLIABLE BLB EZ TO GRP 1 HND USE W/

## (undated) DEVICE — DRESSING TRNSPAR W8XL12IN FLM SURESITE 123

## (undated) DEVICE — GOWN,ECLIPSE,POLYRNF,BRTHSLV,L,30/CS: Brand: MEDLINE

## (undated) DEVICE — PAD,ABDOMINAL,5"X9",ST,LF,25/BX: Brand: MEDLINE INDUSTRIES, INC.

## (undated) DEVICE — SPONGE GZ W4XL8IN COT WVN 12 PLY

## (undated) DEVICE — STAPLER SKIN W5.7XL3.8MM CLSR 0.5MM WIRE S STL WIDE 35

## (undated) DEVICE — WAX SURG 2.5GM HEMSTAT BNE BEESWAX PARAFFIN ISO PALMITATE

## (undated) DEVICE — DRAPE,U/ SHT,SPLIT,PLAS,STERIL: Brand: MEDLINE

## (undated) DEVICE — PREMIUM WET SKIN PREP TRAY: Brand: MEDLINE INDUSTRIES, INC.

## (undated) DEVICE — GLOVE ORANGE PI 7   MSG9070

## (undated) DEVICE — GLOVE SURG SZ 65 CRM LTX FREE POLYISOPRENE POLYMER BEAD ANTI